# Patient Record
Sex: FEMALE | Race: AMERICAN INDIAN OR ALASKA NATIVE | NOT HISPANIC OR LATINO | Employment: PART TIME | URBAN - METROPOLITAN AREA
[De-identification: names, ages, dates, MRNs, and addresses within clinical notes are randomized per-mention and may not be internally consistent; named-entity substitution may affect disease eponyms.]

---
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
## 2021-07-19 ENCOUNTER — HISTORICAL (OUTPATIENT)
Dept: ADMINISTRATIVE | Facility: HOSPITAL | Age: 21
End: 2021-07-19

## 2021-07-19 LAB
EST. AVERAGE GLUCOSE BLD GHB EST-MCNC: 108.3 MG/DL
HBA1C MFR BLD: 5.4 %

## 2021-12-14 ENCOUNTER — HISTORICAL (OUTPATIENT)
Dept: CARDIOLOGY | Facility: HOSPITAL | Age: 21
End: 2021-12-14

## 2022-04-09 ENCOUNTER — HISTORICAL (OUTPATIENT)
Dept: ADMINISTRATIVE | Facility: HOSPITAL | Age: 22
End: 2022-04-09
Payer: MEDICAID

## 2022-04-26 VITALS
DIASTOLIC BLOOD PRESSURE: 83 MMHG | SYSTOLIC BLOOD PRESSURE: 110 MMHG | OXYGEN SATURATION: 98 % | BODY MASS INDEX: 29.12 KG/M2 | WEIGHT: 196.63 LBS | HEIGHT: 69 IN

## 2022-07-08 RX ORDER — RIZATRIPTAN BENZOATE 10 MG/1
1 TABLET ORAL
COMMUNITY
End: 2023-05-26 | Stop reason: SDUPTHER

## 2022-07-08 RX ORDER — VENLAFAXINE HYDROCHLORIDE 37.5 MG/1
1 CAPSULE, EXTENDED RELEASE ORAL DAILY
COMMUNITY
End: 2022-07-21 | Stop reason: SDUPTHER

## 2022-07-08 RX ORDER — GALCANEZUMAB 120 MG/ML
1 INJECTION, SOLUTION SUBCUTANEOUS
COMMUNITY
Start: 2021-11-11 | End: 2022-07-21

## 2022-07-21 ENCOUNTER — OFFICE VISIT (OUTPATIENT)
Dept: NEUROLOGY | Facility: CLINIC | Age: 22
End: 2022-07-21
Payer: MEDICAID

## 2022-07-21 VITALS
BODY MASS INDEX: 26.84 KG/M2 | WEIGHT: 181.19 LBS | TEMPERATURE: 98 F | OXYGEN SATURATION: 99 % | HEIGHT: 69 IN | HEART RATE: 76 BPM | DIASTOLIC BLOOD PRESSURE: 63 MMHG | SYSTOLIC BLOOD PRESSURE: 111 MMHG | RESPIRATION RATE: 18 BRPM

## 2022-07-21 DIAGNOSIS — F44.5 PSYCHOGENIC NONEPILEPTIC SEIZURE: ICD-10-CM

## 2022-07-21 DIAGNOSIS — G43.109 MIGRAINE WITH AURA AND WITHOUT STATUS MIGRAINOSUS, NOT INTRACTABLE: Primary | ICD-10-CM

## 2022-07-21 DIAGNOSIS — G47.9 SLEEP DISORDER: ICD-10-CM

## 2022-07-21 PROCEDURE — 1160F RVW MEDS BY RX/DR IN RCRD: CPT | Mod: CPTII,,, | Performed by: NURSE PRACTITIONER

## 2022-07-21 PROCEDURE — 3008F BODY MASS INDEX DOCD: CPT | Mod: CPTII,,, | Performed by: NURSE PRACTITIONER

## 2022-07-21 PROCEDURE — 1160F PR REVIEW ALL MEDS BY PRESCRIBER/CLIN PHARMACIST DOCUMENTED: ICD-10-PCS | Mod: CPTII,,, | Performed by: NURSE PRACTITIONER

## 2022-07-21 PROCEDURE — 99214 OFFICE O/P EST MOD 30 MIN: CPT | Mod: S$PBB,,, | Performed by: NURSE PRACTITIONER

## 2022-07-21 PROCEDURE — 3008F PR BODY MASS INDEX (BMI) DOCUMENTED: ICD-10-PCS | Mod: CPTII,,, | Performed by: NURSE PRACTITIONER

## 2022-07-21 PROCEDURE — 3074F PR MOST RECENT SYSTOLIC BLOOD PRESSURE < 130 MM HG: ICD-10-PCS | Mod: CPTII,,, | Performed by: NURSE PRACTITIONER

## 2022-07-21 PROCEDURE — 3078F PR MOST RECENT DIASTOLIC BLOOD PRESSURE < 80 MM HG: ICD-10-PCS | Mod: CPTII,,, | Performed by: NURSE PRACTITIONER

## 2022-07-21 PROCEDURE — 1159F PR MEDICATION LIST DOCUMENTED IN MEDICAL RECORD: ICD-10-PCS | Mod: CPTII,,, | Performed by: NURSE PRACTITIONER

## 2022-07-21 PROCEDURE — 99214 PR OFFICE/OUTPT VISIT, EST, LEVL IV, 30-39 MIN: ICD-10-PCS | Mod: S$PBB,,, | Performed by: NURSE PRACTITIONER

## 2022-07-21 PROCEDURE — 1159F MED LIST DOCD IN RCRD: CPT | Mod: CPTII,,, | Performed by: NURSE PRACTITIONER

## 2022-07-21 PROCEDURE — 3078F DIAST BP <80 MM HG: CPT | Mod: CPTII,,, | Performed by: NURSE PRACTITIONER

## 2022-07-21 PROCEDURE — 99214 OFFICE O/P EST MOD 30 MIN: CPT | Mod: PBBFAC | Performed by: NURSE PRACTITIONER

## 2022-07-21 PROCEDURE — 3074F SYST BP LT 130 MM HG: CPT | Mod: CPTII,,, | Performed by: NURSE PRACTITIONER

## 2022-07-21 RX ORDER — VENLAFAXINE HYDROCHLORIDE 75 MG/1
75 CAPSULE, EXTENDED RELEASE ORAL DAILY
Qty: 90 CAPSULE | Refills: 2 | Status: SHIPPED | OUTPATIENT
Start: 2022-07-21 | End: 2023-02-07 | Stop reason: SDUPTHER

## 2022-07-21 RX ORDER — DEXTROAMPHETAMINE SACCHARATE, AMPHETAMINE ASPARTATE, DEXTROAMPHETAMINE SULFATE AND AMPHETAMINE SULFATE 7.5; 7.5; 7.5; 7.5 MG/1; MG/1; MG/1; MG/1
30 TABLET ORAL 2 TIMES DAILY
COMMUNITY

## 2022-07-21 NOTE — PROGRESS NOTES
"Subjective:       Patient ID: Carolina Rabago is a 22 y.o. female.    Chief Complaint: Seizures    HPI   This is a 22 year old right handed female with PMHX of ADHD, migraine headache disorder , who was referred for paroxysmal events X1. Pt was last seen on 2022. During that visit, Effexor was increased to 75mg daily.    Today, pt states her migraines have improved. Has had 2 migraines since last visit. Maxalt effective. Today has a "regular" HA but believes it is due to not having eaten today.     Denies any further "seizure" activity. Is currently in phlebotomy school.   Sleep has improved    Paroxsysmal Event  Age of Onset - 21, one time event    Semiology - headache followed by LOC. possibly shaking. Mom has witnessed the event but did not elaborate on the details of the event.    Frequency - last event 10/2020    Provocation Factors - headache    Risk Factors -  - Family history of seizures? denied  - History of focal CNS lesions? denied  - History of CNS infection? denied  - History of Head Trauma with prolonged LOC? denied  - History of childhood seizures, including febrile seizures? denied  - History of developmental delay? denied  - History of underlying mood disorder? ADHD, taking Adderall during the day  - History of sleep disorder? not sleeping well at night  - Illicit drug use? denied  - Alcohol use? denied    Workup -  - routine EE2021 - Normal routine awake and drowsy EEG (read by Dr. Hernández)  - extended 1 hour EEG:  - ambulatory 72-96 hr EEG:  - EMU video EEG:  - MRI Brain:  - NCHCT:  - SPECT Brain:    Current AEDs -  none    Prior AEDs -  none    Headache Disorder  Age of Onset - teenage years    Semiology -  1. Holocephalic, pounding, severe, lasting 24 hrs, with n/v, w/ photo and phonophobia. - 3 MHD/Month  2. Bitemporal, dull, constant, mild, w/o nausea, w/ photophobia, not impeding day to day activity - 1 headache days per month    Pre-CGRP Inhibitor Frequency - 4 " MGD/month    Exacerbating Factors - stress.    Current ppx meds -  Effexor ER 75mg (1/19/2022 - present) effective    Current abortive meds -  Maxalt 10mg PO PRN migraine     Prior ppx meds -  TPM 50mg daily - anxiety  Amitriptyline 50mg qhs - weight gain, sedation. Only takes PRN.  Emgality 120mg/mL once per month    Prior abortive meds -   Sumatriptan PRN - ineffective     Review of Systems    Constitutional: no fever, fatigue, weakness  Eye: no vision loss, eye redness, drainage, or pain  ENMT: no sore throat, ear pain, sinus pain/congestion, nasal congestion/drainage  Respiratory: no cough, no wheezing, no shortness of breath  Cardiovascular: no chest pain, no palpitations, no edema  Gastrointestinal: no nausea, vomiting, or diarrhea. No abdominal pain  Genitourinary: no dysuria, no urinary frequency or urgency, no hematuria  Hema/Lymph: no abnormal bruising or bleeding  Endocrine: no heat or cold intolerance, no excessive thirst or excessive urination  Musculoskeletal: no muscle or joint pain, no joint swelling  Integumentary: no skin rash or abnormal lesion  Psychiatric: no depressed mood, no anxiety, no visual/auditory hallucinations, no delusions, no suicidal or homicidal ideation  Neurologic: as per HPI    Objective:      Physical Exam    General: well-developed well-nourished in no acute distress  Eye: clear conjunctiva, eyelids normal  HENT: TMs/ear canals clear, oropharynx without erythema/exudate, oropharynx and nasal mucosal surfaces moist, no maxillary/frontal sinus tenderness to palpation  Neck: full range of motion, no thyromegaly or lymphadenopathy  Respiratory: clear to auscultation bilaterally  Cardiovascular: regular rate and rhythm without murmurs, gallops or rubs  Gastrointestinal: soft, non-tender, non-distended with normal bowel sounds, without masses to palpation  Genitourinary: no CVA tenderness to palpation  Musculoskeletal: full range of motion of all extremities/spine without limitation  "or discomfort  Integumentary: no rashes or skin lesions present  Neurologic:  Mental Status- Alert and Oriented to time, self, place, Speech is fluent, with intact repetition, naming, reading, and comprehension, long term memory intact, No Dysarthria.  CN II-XII - CN I Deferred, LIZETT, no RAPD, VA grossly normal to finger counting at 6ft, No ptosis b/l, EOMI w/o nystagmus, LT/PP symmetric, intact in CN V1-V3 distribution b/l, masseter symmetric b/l, T/U midline, Shoulder shrug symmetric b/l, Hearing grossly intact b/l, VFFC, Face Symmetric.  Motor - Tone and Bulk nml throughout, No involuntary movements, 5/5 to confrontation throughout, FFM nml b/l, No pronator drift.  Sensory - LT/PP/Temp/Proprioception/Vibration symmetric intact throughout.  Reflexes - 2+ Throughout  Cerebellar Exam - FNF wnl b/l no intention tremor.  Romberg - negative.  Gait - Normal, bilat arm swing intaact    Assessment:       1. Migraine with aura and without status migrainosus, not intractable    2. Psychogenic nonepileptic seizure    3. Sleep disorder      Plan:       This is a 22 year old right handed female with PMHX of ADHD, Migraine headache disorder, Anxiety disorder who was referred for paroxysmal event with description consistent with PNES. Also has underlying episodic migraine headache w/o status, and episodic tension headaches. Denies any further "seizure" activity. Notes improvement with migraine frequency on Effexor.     # PNES  [] recommend treating underlying anxiety - patient provided number to [facility]    # Episodic Migraine Headache w/o Aura  [] patient does not meet criteria for monthly CGRP inhibitor biologics.  [] c/w Maxalt 10mg BID PRN  [] c/w Effexor XR to 75mg daily  [] Handout on sleep hygiene provided    RTC 6 months - TM okay    Headache education provided - including good sleep hygiene, stress management, medication overuse education provided - using more 3 OTC per week may worsen headaches, High " intensity interval training has shown to reduce headache frequency. Low carb, high protein has shown to reduce headache frequency. Patient is instructed to keep headache diary.    I have explained the treatment plan, diagnosis, and prognosis to the patient, caretaker, family. All questions are answered to the best of my knowledge.    Face to Face Time 30 minute, including, counseling, education, review of test results, relevant medical records, and coordination of care.

## 2023-02-07 ENCOUNTER — OFFICE VISIT (OUTPATIENT)
Dept: NEUROLOGY | Facility: CLINIC | Age: 23
End: 2023-02-07
Payer: MEDICAID

## 2023-02-07 VITALS
BODY MASS INDEX: 29.91 KG/M2 | RESPIRATION RATE: 18 BRPM | OXYGEN SATURATION: 98 % | HEIGHT: 69 IN | WEIGHT: 201.94 LBS | HEART RATE: 84 BPM | DIASTOLIC BLOOD PRESSURE: 79 MMHG | SYSTOLIC BLOOD PRESSURE: 121 MMHG | TEMPERATURE: 98 F

## 2023-02-07 DIAGNOSIS — G47.9 SLEEP DISORDER: ICD-10-CM

## 2023-02-07 DIAGNOSIS — F44.5 PSYCHOGENIC NONEPILEPTIC SEIZURE: ICD-10-CM

## 2023-02-07 DIAGNOSIS — G43.109 MIGRAINE WITH AURA AND WITHOUT STATUS MIGRAINOSUS, NOT INTRACTABLE: Primary | ICD-10-CM

## 2023-02-07 DIAGNOSIS — M54.2 NECK PAIN: ICD-10-CM

## 2023-02-07 PROCEDURE — 99214 PR OFFICE/OUTPT VISIT, EST, LEVL IV, 30-39 MIN: ICD-10-PCS | Mod: S$PBB,,, | Performed by: NURSE PRACTITIONER

## 2023-02-07 PROCEDURE — 99214 OFFICE O/P EST MOD 30 MIN: CPT | Mod: S$PBB,,, | Performed by: NURSE PRACTITIONER

## 2023-02-07 PROCEDURE — 1159F MED LIST DOCD IN RCRD: CPT | Mod: CPTII,,, | Performed by: NURSE PRACTITIONER

## 2023-02-07 PROCEDURE — 3078F DIAST BP <80 MM HG: CPT | Mod: CPTII,,, | Performed by: NURSE PRACTITIONER

## 2023-02-07 PROCEDURE — 3008F PR BODY MASS INDEX (BMI) DOCUMENTED: ICD-10-PCS | Mod: CPTII,,, | Performed by: NURSE PRACTITIONER

## 2023-02-07 PROCEDURE — 99214 OFFICE O/P EST MOD 30 MIN: CPT | Mod: PBBFAC | Performed by: NURSE PRACTITIONER

## 2023-02-07 PROCEDURE — 3078F PR MOST RECENT DIASTOLIC BLOOD PRESSURE < 80 MM HG: ICD-10-PCS | Mod: CPTII,,, | Performed by: NURSE PRACTITIONER

## 2023-02-07 PROCEDURE — 1160F RVW MEDS BY RX/DR IN RCRD: CPT | Mod: CPTII,,, | Performed by: NURSE PRACTITIONER

## 2023-02-07 PROCEDURE — 1160F PR REVIEW ALL MEDS BY PRESCRIBER/CLIN PHARMACIST DOCUMENTED: ICD-10-PCS | Mod: CPTII,,, | Performed by: NURSE PRACTITIONER

## 2023-02-07 PROCEDURE — 3074F PR MOST RECENT SYSTOLIC BLOOD PRESSURE < 130 MM HG: ICD-10-PCS | Mod: CPTII,,, | Performed by: NURSE PRACTITIONER

## 2023-02-07 PROCEDURE — 3008F BODY MASS INDEX DOCD: CPT | Mod: CPTII,,, | Performed by: NURSE PRACTITIONER

## 2023-02-07 PROCEDURE — 3074F SYST BP LT 130 MM HG: CPT | Mod: CPTII,,, | Performed by: NURSE PRACTITIONER

## 2023-02-07 PROCEDURE — 1159F PR MEDICATION LIST DOCUMENTED IN MEDICAL RECORD: ICD-10-PCS | Mod: CPTII,,, | Performed by: NURSE PRACTITIONER

## 2023-02-07 RX ORDER — VENLAFAXINE HYDROCHLORIDE 37.5 MG/1
37.5 CAPSULE, EXTENDED RELEASE ORAL DAILY
Qty: 90 CAPSULE | Refills: 2 | Status: SHIPPED | OUTPATIENT
Start: 2023-02-07 | End: 2023-02-07

## 2023-02-07 RX ORDER — VENLAFAXINE HYDROCHLORIDE 37.5 MG/1
37.5 CAPSULE, EXTENDED RELEASE ORAL DAILY
Qty: 90 CAPSULE | Refills: 2 | Status: SHIPPED | OUTPATIENT
Start: 2023-02-07 | End: 2023-05-26 | Stop reason: SDUPTHER

## 2023-02-07 RX ORDER — TIZANIDINE 2 MG/1
4 TABLET ORAL 2 TIMES DAILY PRN
Qty: 60 TABLET | Refills: 2 | Status: SHIPPED | OUTPATIENT
Start: 2023-02-07 | End: 2024-02-17

## 2023-02-07 NOTE — PROGRESS NOTES
"Subjective:       Patient ID: Carolina Rabago is a 22 y.o. female.    Chief Complaint: Migraine (Pt stated migraines are getting bad again)    HPI   This is a 22 year old right handed female with PMHX of ADHD, migraine headache disorder, who was referred for paroxysmal events X1. Pt was last seen on 2022. During that visit, Effexor 75mg Qday and Maxalt 10mg PO BID PRN were continued.    Today, Pt c/o regular HA, this is the 3rd, since last Thursday. Located to lower neck area. May take ibuprofen 800mg or Tylenol ES 2 tabs PRN with minimal relief. Requesting a decrease in Effexor as she feeling more "anxious". Migraines are same, last one in November. Maxalt continues to be effective.    Denies any further "seizure" activity. Is currently in phlebotomy school.   Sleep has improved    Paroxsysmal Event  Age of Onset - 21, one time event    Semiology - headache followed by LOC. possibly shaking. Mom has witnessed the event but did not elaborate on the details of the event.    Frequency - last event 10/2020    Provocation Factors - headache    Risk Factors -  - Family history of seizures? denied  - History of focal CNS lesions? denied  - History of CNS infection? denied  - History of Head Trauma with prolonged LOC? denied  - History of childhood seizures, including febrile seizures? denied  - History of developmental delay? denied  - History of underlying mood disorder? ADHD, taking Adderall during the day  - History of sleep disorder? not sleeping well at night  - Illicit drug use? denied  - Alcohol use? denied    Workup -  - routine EE2021 - Normal routine awake and drowsy EEG (read by Dr. Hernández)  - extended 1 hour EEG:  - ambulatory 72-96 hr EEG:  - EMU video EEG:  - MRI Brain:  - NCHCT:  - SPECT Brain:    Current AEDs -  none    Prior AEDs -  none    Headache Disorder  Age of Onset - teenage years    Semiology -  1. Holocephalic, pounding, severe, lasting 24 hrs, with n/v, w/ photo and phonophobia. - 3 " MHD/Month  2. Bitemporal, dull, constant, mild, w/o nausea, w/ photophobia, not impeding day to day activity - 1 headache days per month    Pre-CGRP Inhibitor Frequency - 4 MGD/month    Exacerbating Factors - stress.    Current ppx meds -  Effexor ER 75mg (1/19/2022 - present) effective    Current abortive meds -  Maxalt 10mg PO PRN migraine     Prior ppx meds -  TPM 50mg daily - anxiety  Amitriptyline 50mg qhs - weight gain, sedation. Only takes PRN.  Emgality 120mg/mL once per month    Prior abortive meds -   Sumatriptan PRN - ineffective     Review of Systems    Constitutional: no fever, fatigue, weakness  Eye: no vision loss, eye redness, drainage, or pain  ENMT: no sore throat, ear pain, sinus pain/congestion, nasal congestion/drainage  Respiratory: no cough, no wheezing, no shortness of breath  Cardiovascular: no chest pain, no palpitations, no edema  Gastrointestinal: no nausea, vomiting, or diarrhea. No abdominal pain  Genitourinary: no dysuria, no urinary frequency or urgency, no hematuria  Hema/Lymph: no abnormal bruising or bleeding  Endocrine: no heat or cold intolerance, no excessive thirst or excessive urination  Musculoskeletal: no muscle or joint pain, no joint swelling  Integumentary: no skin rash or abnormal lesion  Psychiatric: no depressed mood, no anxiety, no visual/auditory hallucinations, no delusions, no suicidal or homicidal ideation  Neurologic: as per HPI    Objective:      Physical Exam    General: well-developed well-nourished in no acute distress  Eye: clear conjunctiva, eyelids normal  HENT: TMs/ear canals clear, oropharynx without erythema/exudate, oropharynx and nasal mucosal surfaces moist, no maxillary/frontal sinus tenderness to palpation  Neck: full range of motion, no thyromegaly or lymphadenopathy  Respiratory: clear to auscultation bilaterally  Cardiovascular: regular rate and rhythm without murmurs, gallops or rubs  Gastrointestinal: soft, non-tender, non-distended with  "normal bowel sounds, without masses to palpation  Genitourinary: no CVA tenderness to palpation  Musculoskeletal: full range of motion of all extremities/spine without limitation or discomfort  Integumentary: no rashes or skin lesions present  Neurologic:  Mental Status- Alert and Oriented to time, self, place, Speech is fluent, with intact repetition, naming, reading, and comprehension, long term memory intact, No Dysarthria.  CN II-XII - CN I Deferred, LIZETT, no RAPD, VA grossly normal to finger counting at 6ft, No ptosis b/l, EOMI w/o nystagmus, LT/PP symmetric, intact in CN V1-V3 distribution b/l, masseter symmetric b/l, T/U midline, Shoulder shrug symmetric b/l, Hearing grossly intact b/l, VFFC, Face Symmetric.  Motor - Tone and Bulk nml throughout, No involuntary movements, 5/5 to confrontation throughout, FFM nml b/l, No pronator drift.  Sensory - LT/PP/Temp/Proprioception/Vibration symmetric intact throughout.  Reflexes - 2+ Throughout  Cerebellar Exam - FNF wnl b/l no intention tremor.  Romberg - negative.  Gait - Normal, bilat arm swing intaact    Assessment:       1. Migraine with aura and without status migrainosus, not intractable    2. Psychogenic nonepileptic seizure    3. Sleep disorder        Plan:       This is a 22 year old right handed female with PMHX of ADHD, Migraine headache disorder, Anxiety disorder who was referred for paroxysmal event with description consistent with PNES. Also has underlying episodic migraine headache w/o status, and episodic tension headaches. Denies any further "seizure" activity. Notes improvement with migraine frequency on Effexor.     # PNES  [] recommend treating underlying anxiety - patient provided number to [facility]    # Episodic Migraine Headache w/o Aura  [] patient does not meet criteria for monthly CGRP inhibitor biologics.  [] c/w Maxalt 10mg BID PRN  [] decrease Effexor XR to 37.5mg daily  [] start tizanidine 2mg PO BID PRN neck pain/HA  [] " Handout on sleep hygiene provided    RTC 3 months    Headache education provided - including good sleep hygiene, stress management, medication overuse education provided - using more 3 OTC per week may worsen headaches, High intensity interval training has shown to reduce headache frequency. Low carb, high protein has shown to reduce headache frequency. Patient is instructed to keep headache diary.    I have explained the treatment plan, diagnosis, and prognosis to the patient, caretaker, family. All questions are answered to the best of my knowledge.    Face to Face Time 30 minute, including, counseling, education, review of test results, relevant medical records, and coordination of care.

## 2023-05-26 ENCOUNTER — OFFICE VISIT (OUTPATIENT)
Dept: NEUROLOGY | Facility: CLINIC | Age: 23
End: 2023-05-26
Payer: MEDICAID

## 2023-05-26 VITALS
BODY MASS INDEX: 29.92 KG/M2 | SYSTOLIC BLOOD PRESSURE: 115 MMHG | HEART RATE: 95 BPM | WEIGHT: 202 LBS | HEIGHT: 69 IN | OXYGEN SATURATION: 100 % | DIASTOLIC BLOOD PRESSURE: 79 MMHG

## 2023-05-26 DIAGNOSIS — G43.109 MIGRAINE WITH AURA AND WITHOUT STATUS MIGRAINOSUS, NOT INTRACTABLE: Primary | ICD-10-CM

## 2023-05-26 DIAGNOSIS — H53.8 BLURRED VISION, LEFT EYE: ICD-10-CM

## 2023-05-26 DIAGNOSIS — F44.5 PSYCHOGENIC NONEPILEPTIC SEIZURE: ICD-10-CM

## 2023-05-26 DIAGNOSIS — F41.9 ANXIETY: ICD-10-CM

## 2023-05-26 DIAGNOSIS — G47.9 SLEEP DISORDER: ICD-10-CM

## 2023-05-26 PROCEDURE — 1160F PR REVIEW ALL MEDS BY PRESCRIBER/CLIN PHARMACIST DOCUMENTED: ICD-10-PCS | Mod: CPTII,,, | Performed by: NURSE PRACTITIONER

## 2023-05-26 PROCEDURE — 99214 OFFICE O/P EST MOD 30 MIN: CPT | Mod: S$PBB,,, | Performed by: NURSE PRACTITIONER

## 2023-05-26 PROCEDURE — 1159F MED LIST DOCD IN RCRD: CPT | Mod: CPTII,,, | Performed by: NURSE PRACTITIONER

## 2023-05-26 PROCEDURE — 1160F RVW MEDS BY RX/DR IN RCRD: CPT | Mod: CPTII,,, | Performed by: NURSE PRACTITIONER

## 2023-05-26 PROCEDURE — 3008F BODY MASS INDEX DOCD: CPT | Mod: CPTII,,, | Performed by: NURSE PRACTITIONER

## 2023-05-26 PROCEDURE — 99214 PR OFFICE/OUTPT VISIT, EST, LEVL IV, 30-39 MIN: ICD-10-PCS | Mod: S$PBB,,, | Performed by: NURSE PRACTITIONER

## 2023-05-26 PROCEDURE — 1159F PR MEDICATION LIST DOCUMENTED IN MEDICAL RECORD: ICD-10-PCS | Mod: CPTII,,, | Performed by: NURSE PRACTITIONER

## 2023-05-26 PROCEDURE — 3078F PR MOST RECENT DIASTOLIC BLOOD PRESSURE < 80 MM HG: ICD-10-PCS | Mod: CPTII,,, | Performed by: NURSE PRACTITIONER

## 2023-05-26 PROCEDURE — 99214 OFFICE O/P EST MOD 30 MIN: CPT | Mod: PBBFAC | Performed by: NURSE PRACTITIONER

## 2023-05-26 PROCEDURE — 3008F PR BODY MASS INDEX (BMI) DOCUMENTED: ICD-10-PCS | Mod: CPTII,,, | Performed by: NURSE PRACTITIONER

## 2023-05-26 PROCEDURE — 3078F DIAST BP <80 MM HG: CPT | Mod: CPTII,,, | Performed by: NURSE PRACTITIONER

## 2023-05-26 PROCEDURE — 3074F SYST BP LT 130 MM HG: CPT | Mod: CPTII,,, | Performed by: NURSE PRACTITIONER

## 2023-05-26 PROCEDURE — 3074F PR MOST RECENT SYSTOLIC BLOOD PRESSURE < 130 MM HG: ICD-10-PCS | Mod: CPTII,,, | Performed by: NURSE PRACTITIONER

## 2023-05-26 RX ORDER — VENLAFAXINE HYDROCHLORIDE 75 MG/1
75 CAPSULE, EXTENDED RELEASE ORAL DAILY
Qty: 90 CAPSULE | Refills: 2 | Status: SHIPPED | OUTPATIENT
Start: 2023-05-26 | End: 2023-08-30 | Stop reason: SDUPTHER

## 2023-05-26 RX ORDER — ONDANSETRON 4 MG/1
4 TABLET, ORALLY DISINTEGRATING ORAL
COMMUNITY
Start: 2023-02-09

## 2023-05-26 RX ORDER — RIZATRIPTAN BENZOATE 10 MG/1
10 TABLET ORAL
Qty: 12 TABLET | Refills: 2 | Status: SHIPPED | OUTPATIENT
Start: 2023-05-26 | End: 2023-11-30 | Stop reason: SDUPTHER

## 2023-05-26 RX ORDER — ASPIRIN 325 MG
50000 TABLET, DELAYED RELEASE (ENTERIC COATED) ORAL
COMMUNITY
Start: 2023-04-06

## 2023-05-26 RX ORDER — HYDROXYZINE PAMOATE 25 MG/1
25 CAPSULE ORAL 4 TIMES DAILY PRN
COMMUNITY
Start: 2022-12-21

## 2023-05-26 RX ORDER — NORETHINDRONE ACETATE AND ETHINYL ESTRADIOL 1MG-20(21)
1 KIT ORAL DAILY
COMMUNITY
Start: 2023-04-06 | End: 2024-03-04

## 2023-05-26 NOTE — PROGRESS NOTES
"[facility] Neurology Follow Up Visit Note  Subjective:       Patient ID: Carolina Rabago is a 22 y.o. female.    Chief Complaint: Migraine (Pt reports no migraines for a few months, but does report mild headaches occasionally)    HPI   This is a 22 year old right handed female with PMHX of ADHD, migraine headache disorder, who was referred for paroxysmal events X1. Pt was last seen on 2/7/2023. During that visit, Effexor was decreased to 37.5mg Qday, Maxalt 10mg PO BID PRN was continued and tizanidine 2mg PO BID PRN was initiated.    Today, Pt c/o regular HA that occur twice weekly. Last one last night, but believes related to lack of sleep. Also admits to decreased dietary intake. Does not a full meal until 4 in afternoon. HA starts in lower neck area and radiates to head and eyes on occasion. May take ibuprofen 800mg or Tylenol ES 2 tabs PRN with minimal relief. Tizanidine effective for neck pain and HA. Maxalt effective as abortive therapy for migraine. Cannot recall last migraine    Denies any further "seizure" activity. 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 States anxiety has worsened and feels like she is having a "panic" attack. Not exacerbated by thing that she can think of.  Last one a month ago.    Sleep is intermittent. Works late shift and gets off at 1AM.    Paroxsysmal Event  Age of Onset - 21, one time event    Semiology - headache followed by LOC. possibly shaking. Mom has witnessed the event but did not elaborate on the details of the event.    Frequency - last event 10/2020    Provocation Factors - headache    Risk Factors -  - Family history of seizures? denied  - History of focal CNS lesions? denied  - History of CNS infection? denied  - History of Head Trauma with prolonged LOC? denied  - History of childhood seizures, including febrile seizures? denied  - History of developmental delay? denied  - History of underlying mood disorder? ADHD, taking Adderall during the day  - " History of sleep disorder? not sleeping well at night  - Illicit drug use? denied  - Alcohol use? denied    Workup -  - routine EE2021 - Normal routine awake and drowsy EEG (read by Dr. Hernández)  - extended 1 hour EEG:  - ambulatory 72-96 hr EEG:  - EMU video EEG:  - MRI Brain:  - NCHCT:  - SPECT Brain:    Current AEDs -  none    Prior AEDs -  none    Headache Disorder  Age of Onset - teenage years    Semiology -  1. Holocephalic, pounding, severe, lasting 24 hrs, with n/v, w/ photo and phonophobia. - 3 MHD/Month  2. Bitemporal, dull, constant, mild, w/o nausea, w/ photophobia, not impeding day to day activity - 1 headache days per month    Pre-CGRP Inhibitor Frequency - 4 MGD/month    Exacerbating Factors - stress.    Current ppx meds -  Effexor ER 37.5mg (2022 - present) effective    Current abortive meds -  Maxalt 10mg PO PRN migraine     Prior ppx meds -  TPM 50mg daily - anxiety  Amitriptyline 50mg qhs - weight gain, sedation. Only takes PRN.  Emgality 120mg/mL once per month    Prior abortive meds -   Sumatriptan PRN - ineffective     Review of Systems    Constitutional: no fever, fatigue, weakness  Eye: no vision loss, eye redness, drainage, or pain  ENMT: no sore throat, ear pain, sinus pain/congestion, nasal congestion/drainage  Respiratory: no cough, no wheezing, no shortness of breath  Cardiovascular: no chest pain, no palpitations, no edema  Gastrointestinal: no nausea, vomiting, or diarrhea. No abdominal pain  Genitourinary: no dysuria, no urinary frequency or urgency, no hematuria  Hema/Lymph: no abnormal bruising or bleeding  Endocrine: no heat or cold intolerance, no excessive thirst or excessive urination  Musculoskeletal: no muscle or joint pain, no joint swelling  Integumentary: no skin rash or abnormal lesion  Psychiatric: no depressed mood, no anxiety, no visual/auditory hallucinations, no delusions, no suicidal or homicidal ideation  Neurologic: as per HPI    Objective:      Physical  Exam    General: well-developed well-nourished in no acute distress  Eye: clear conjunctiva, eyelids normal  HENT: oropharynx without erythema/exudate, oropharynx and nasal mucosal surfaces moist, no maxillary/frontal sinus tenderness to palpation  Neck: full range of motion, no thyromegaly or lymphadenopathy  Respiratory: clear to auscultation bilaterally  Cardiovascular: regular rate and rhythm   Gastrointestinal: non-distended  Musculoskeletal: full range of motion of all extremities/spine without limitation or discomfort  Integumentary: no rashes or skin lesions present    Neurologic:  Mental Status- Alert and Oriented to time, self, place, Speech is fluent, with intact repetition, naming, reading, and comprehension, long term memory intact, No Dysarthria.  CN II-XII - CN I Deferred, LIZETT, no RAPD, VA grossly normal to finger counting at 6ft, No ptosis b/l, EOMI w/o nystagmus, LT/PP symmetric, intact in CN V1-V3 distribution b/l, masseter symmetric b/l, T/U midline, Shoulder shrug symmetric b/l, Hearing grossly intact b/l, VFFC, Face Symmetric.  Motor - Tone and Bulk nml throughout, No involuntary movements, 5/5 to confrontation throughout, FFM nml b/l, No pronator drift.  Sensory - LT/PP/Temp/Proprioception/Vibration symmetric intact throughout.  Reflexes - 2+ Throughout  Cerebellar Exam - FNF wnl b/l no intention tremor.  Romberg - negative.  Gait - Normal, toe gait, heel gait and tandem gait intact, bilat arm swing intaact    Assessment:       1. Migraine with aura and without status migrainosus, not intractable  - venlafaxine (EFFEXOR-XR) 75 MG 24 hr capsule; Take 1 capsule (75 mg total) by mouth once daily.  Dispense: 90 capsule; Refill: 2    2. Psychogenic nonepileptic seizure    3. Sleep disorder    4. Blurred vision, left eye  - Ambulatory referral/consult to Ophthalmology; Future    5. Anxiety  - venlafaxine (EFFEXOR-XR) 75 MG 24 hr capsule; Take 1 capsule (75 mg total) by mouth once daily.  Dispense:  "90 capsule; Refill: 2      Plan:       This is a 22 year old right handed female with PMHX of ADHD, Migraine headache disorder, Anxiety disorder who was referred for paroxysmal event with description consistent with PNES. Also has underlying episodic migraine headache w/o status, and episodic tension headaches. Denies any further "seizure" activity. Notes improvement with migraine frequency on Effexor. Attributes regular Ha to lack of sleep and anxiety.    # PNES  [] call PCP and request referral to [facility] mental health services for anxiety    # Episodic Migraine Headache w/o Aura  [] patient does not meet criteria for monthly CGRP inhibitor biologics.  [] c/w Maxalt 10mg BID PRN  [] increase Effexor XR to 75mg daily  [] c/w tizanidine 2mg PO BID PRN neck pain/HA  [] Handout on meditation provided    RTC 3 months    Headache education provided - including good sleep hygiene, stress management, medication overuse education provided - using more 3 OTC per week may worsen headaches, High intensity interval training has shown to reduce headache frequency. Low carb, high protein has shown to reduce headache frequency. Patient is instructed to keep headache diary.    I have explained the treatment plan, diagnosis, and prognosis to the patient, caretaker, family. All questions are answered to the best of my knowledge.    Face to Face Time 30 minute, including, counseling, education, review of test results, relevant medical records, and coordination of care.                "

## 2023-08-30 ENCOUNTER — OFFICE VISIT (OUTPATIENT)
Dept: NEUROLOGY | Facility: CLINIC | Age: 23
End: 2023-08-30
Payer: MEDICAID

## 2023-08-30 VITALS
WEIGHT: 202 LBS | HEART RATE: 95 BPM | HEIGHT: 69 IN | OXYGEN SATURATION: 98 % | SYSTOLIC BLOOD PRESSURE: 116 MMHG | BODY MASS INDEX: 29.92 KG/M2 | DIASTOLIC BLOOD PRESSURE: 86 MMHG

## 2023-08-30 DIAGNOSIS — G43.109 MIGRAINE WITH AURA AND WITHOUT STATUS MIGRAINOSUS, NOT INTRACTABLE: Primary | ICD-10-CM

## 2023-08-30 DIAGNOSIS — G47.00 INSOMNIA, UNSPECIFIED TYPE: ICD-10-CM

## 2023-08-30 DIAGNOSIS — F44.5 PSYCHOGENIC NONEPILEPTIC SEIZURE: ICD-10-CM

## 2023-08-30 DIAGNOSIS — F41.9 ANXIETY: ICD-10-CM

## 2023-08-30 PROCEDURE — 99214 OFFICE O/P EST MOD 30 MIN: CPT | Mod: PBBFAC | Performed by: NURSE PRACTITIONER

## 2023-08-30 PROCEDURE — 3079F DIAST BP 80-89 MM HG: CPT | Mod: CPTII,,, | Performed by: NURSE PRACTITIONER

## 2023-08-30 PROCEDURE — 1160F PR REVIEW ALL MEDS BY PRESCRIBER/CLIN PHARMACIST DOCUMENTED: ICD-10-PCS | Mod: CPTII,,, | Performed by: NURSE PRACTITIONER

## 2023-08-30 PROCEDURE — 99214 OFFICE O/P EST MOD 30 MIN: CPT | Mod: S$PBB,,, | Performed by: NURSE PRACTITIONER

## 2023-08-30 PROCEDURE — 3074F PR MOST RECENT SYSTOLIC BLOOD PRESSURE < 130 MM HG: ICD-10-PCS | Mod: CPTII,,, | Performed by: NURSE PRACTITIONER

## 2023-08-30 PROCEDURE — 3008F PR BODY MASS INDEX (BMI) DOCUMENTED: ICD-10-PCS | Mod: CPTII,,, | Performed by: NURSE PRACTITIONER

## 2023-08-30 PROCEDURE — 3008F BODY MASS INDEX DOCD: CPT | Mod: CPTII,,, | Performed by: NURSE PRACTITIONER

## 2023-08-30 PROCEDURE — 3074F SYST BP LT 130 MM HG: CPT | Mod: CPTII,,, | Performed by: NURSE PRACTITIONER

## 2023-08-30 PROCEDURE — 99214 PR OFFICE/OUTPT VISIT, EST, LEVL IV, 30-39 MIN: ICD-10-PCS | Mod: S$PBB,,, | Performed by: NURSE PRACTITIONER

## 2023-08-30 PROCEDURE — 3079F PR MOST RECENT DIASTOLIC BLOOD PRESSURE 80-89 MM HG: ICD-10-PCS | Mod: CPTII,,, | Performed by: NURSE PRACTITIONER

## 2023-08-30 PROCEDURE — 1160F RVW MEDS BY RX/DR IN RCRD: CPT | Mod: CPTII,,, | Performed by: NURSE PRACTITIONER

## 2023-08-30 PROCEDURE — 1159F MED LIST DOCD IN RCRD: CPT | Mod: CPTII,,, | Performed by: NURSE PRACTITIONER

## 2023-08-30 PROCEDURE — 1159F PR MEDICATION LIST DOCUMENTED IN MEDICAL RECORD: ICD-10-PCS | Mod: CPTII,,, | Performed by: NURSE PRACTITIONER

## 2023-08-30 RX ORDER — TRAZODONE HYDROCHLORIDE 50 MG/1
25 TABLET ORAL NIGHTLY
Qty: 15 TABLET | Refills: 2 | Status: SHIPPED | OUTPATIENT
Start: 2023-08-30 | End: 2024-08-29

## 2023-08-30 RX ORDER — VENLAFAXINE HYDROCHLORIDE 75 MG/1
75 CAPSULE, EXTENDED RELEASE ORAL DAILY
Qty: 90 CAPSULE | Refills: 2 | Status: SHIPPED | OUTPATIENT
Start: 2023-08-30 | End: 2023-11-30 | Stop reason: SDUPTHER

## 2023-08-30 NOTE — PROGRESS NOTES
"[facility] Neurology Follow Up Office Visit Note  Subjective:      Patient ID: Carolina Rabago is a 23 y.o. female.    Chief Complaint: Migraine (Pt reports migraines have been bad lately. States she has one today.)    HPI   This is a 23 year old right handed female with PMHX of ADHD, migraine headache disorder, who was referred for paroxysmal events X1. Pt was last seen on 5/26/2023. During that visit, Effexor was increased to 75mg Qday, Maxalt 10mg PO BID PRN and tizanidine 2mg PO BID PRN were continued.    Today, Pt states she awoke w/HA located to bilat temples. Took Tylenol with no relief thus far. These type of HA occur almost daily. Not sleeping well at this time. Is currently enrolled in a biology class. Work schedule is not consistent. May work days or nights. Averages 4-5 hours of sleep nightly. Does not feel rested.    Also admits to decreased dietary intake, states she does not feel hungry. May have a bagel for breakfast then eats a "full" lunch. Also has HA that starts in lower neck area and radiates to head and eyes on occasion. May take ibuprofen 800mg or Tylenol ES 2 tabs PRN with minimal relief. Tizanidine effective for neck pain and HA. Maxalt effective as abortive therapy for migraine. Cannot recall last migraine. Melatonin causes HA. Has also tried clonidine, did not like the way it made her feel.    Denies any further "seizure" activity. 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 States anxiety is stable. Some days better than others. Has not had a panic attack in a few months. Not exacerbated by thing that she can think of.    Paroxsysmal Event  Age of Onset - 21, one time event    Semiology - headache followed by LOC. possibly shaking. Mom has witnessed the event but did not elaborate on the details of the event.    Frequency - last event 10/2020    Provocation Factors - headache    Risk Factors -  - Family history of seizures? denied  - History of focal CNS lesions? denied  - " History of CNS infection? denied  - History of Head Trauma with prolonged LOC? denied  - History of childhood seizures, including febrile seizures? denied  - History of developmental delay? denied  - History of underlying mood disorder? ADHD, taking Adderall during the day  - History of sleep disorder? not sleeping well at night  - Illicit drug use? denied  - Alcohol use? denied    Workup -  - routine EE2021 - Normal routine awake and drowsy EEG (read by Dr. Hernández)  - extended 1 hour EEG:  - ambulatory 72-96 hr EEG:  - EMU video EEG:  - MRI Brain:  - NCHCT:  - SPECT Brain:    Current AEDs -  none    Prior AEDs -  none    Headache Disorder  Age of Onset - teenage years    Semiology -  1. Holocephalic, pounding, severe, lasting 24 hrs, with n/v, w/ photo and phonophobia. - 3 MHD/Month  2. Bitemporal, dull, constant, mild, w/o nausea, w/ photophobia, not impeding day to day activity - 1 headache days per month    Pre-CGRP Inhibitor Frequency - 4 MGD/month    Exacerbating Factors - stress.    Current ppx meds -  Effexor ER 37.5mg (2022 - present) effective    Current abortive meds -  Maxalt 10mg PO PRN migraine     Prior ppx meds -  TPM 50mg daily - anxiety  Amitriptyline 50mg qhs - weight gain, sedation. Only takes PRN.  Emgality 120mg/mL once per month    Prior abortive meds -   Sumatriptan PRN - ineffective     Review of Systems    Constitutional: no fever, fatigue, weakness  Eye: no vision loss, eye redness, drainage, or pain  ENMT: no sore throat, ear pain, sinus pain/congestion, nasal congestion/drainage  Respiratory: no cough, no wheezing, no shortness of breath  Cardiovascular: no chest pain, no palpitations, no edema  Gastrointestinal: no nausea, vomiting, or diarrhea. No abdominal pain  Genitourinary: no dysuria, no urinary frequency or urgency, no hematuria  Hema/Lymph: no abnormal bruising or bleeding  Endocrine: no heat or cold intolerance, no excessive thirst or excessive  urination  Musculoskeletal: no muscle or joint pain, no joint swelling  Integumentary: no skin rash or abnormal lesion  Psychiatric: no depressed mood, no anxiety, no visual/auditory hallucinations, no delusions, no suicidal or homicidal ideation  Neurologic: as per HPI    Objective:      Physical Exam    General: well-developed well-nourished in no acute distress  Eye: clear conjunctiva, eyelids normal  HENT: oropharynx without erythema/exudate, oropharynx and nasal mucosal surfaces moist, no maxillary/frontal sinus tenderness to palpation  Neck: full range of motion, no thyromegaly or lymphadenopathy  Respiratory: clear to auscultation bilaterally  Cardiovascular: regular rate and rhythm   Gastrointestinal: non-distended  Musculoskeletal: full range of motion of all extremities/spine without limitation or discomfort  Integumentary: no rashes or skin lesions present    Neurologic:  Mental Status- Alert and Oriented to time, self, place, Speech is fluent, with intact repetition, naming, reading, and comprehension, long term memory intact, No Dysarthria.  CN II-XII - CN I Deferred, LIZETT, no RAPD, VA grossly normal to finger counting at 6ft, No ptosis b/l, EOMI w/o nystagmus, LT/PP symmetric, intact in CN V1-V3 distribution b/l, masseter symmetric b/l, T/U midline, Shoulder shrug symmetric b/l, Hearing grossly intact b/l, VFFC, Face Symmetric.  Motor - Tone and Bulk nml throughout, No involuntary movements, 5/5 to confrontation throughout, FFM nml b/l, No pronator drift.  Sensory - LT/PP/Temp/Proprioception/Vibration symmetric intact throughout.  Reflexes - 2+ Throughout  Cerebellar Exam - FNF wnl b/l no intention tremor.  Romberg - negative.  Gait - Normal, toe gait, heel gait and tandem gait intact, bilat arm swing intaact    Assessment:       1. Migraine with aura and without status migrainosus, not intractable  - venlafaxine (EFFEXOR-XR) 75 MG 24 hr capsule; Take 1 capsule (75 mg total) by mouth once daily.   "Dispense: 90 capsule; Refill: 2    2. Psychogenic nonepileptic seizure    3. Insomnia, unspecified type  - traZODone (DESYREL) 50 MG tablet; Take 0.5 tablets (25 mg total) by mouth every evening.  Dispense: 15 tablet; Refill: 2    4. Anxiety  - venlafaxine (EFFEXOR-XR) 75 MG 24 hr capsule; Take 1 capsule (75 mg total) by mouth once daily.  Dispense: 90 capsule; Refill: 2      Plan:       This is a 23 year old right handed female with PMHX of ADHD, Migraine headache disorder, Anxiety disorder who was referred for paroxysmal event with description consistent with PNES. Also has underlying episodic migraine headache w/o status, and episodic tension headaches. Denies any further "seizure" activity. Cannot recall last migraine. Now c/o "regular" HA that occur daily. 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 Anxiety "stable", no recent panic attacks. Not sleeping well, may average 4-5 hrs of sleep nightly.     # PNES  [] call PCP and request referral to [facility] mental health services for anxiety    # Episodic Migraine Headache w/o Aura  [] patient does not meet criteria for monthly CGRP inhibitor biologics.  [] c/w Maxalt 10mg BID PRN  [] c/w Effexor XR to 75mg daily  [] c/w tizanidine 2mg PO BID PRN neck pain/HA  [] Handout on meditation provided    # Insomnia  [] start trazodone 50mg 1/2 tab nightly PRN    RTC 3 months    Headache education provided - including good sleep hygiene, stress management, medication overuse education provided - using more 3 OTC per week may worsen headaches, High intensity interval training has shown to reduce headache frequency. Low carb, high protein has shown to reduce headache frequency. Patient is instructed to keep headache diary.    I have explained the treatment plan, diagnosis, and prognosis to the patient, caretaker, family. All questions are answered to the best of my knowledge.    Face to Face Time 30 minute, including, " counseling, education, review of test results, relevant medical records, and coordination of care.

## 2023-11-07 ENCOUNTER — TELEPHONE (OUTPATIENT)
Dept: OPHTHALMOLOGY | Facility: CLINIC | Age: 23
End: 2023-11-07

## 2023-11-07 ENCOUNTER — OFFICE VISIT (OUTPATIENT)
Dept: OPHTHALMOLOGY | Facility: CLINIC | Age: 23
End: 2023-11-07
Payer: MEDICAID

## 2023-11-07 VITALS — WEIGHT: 201.94 LBS | HEIGHT: 69 IN | BODY MASS INDEX: 29.91 KG/M2

## 2023-11-07 DIAGNOSIS — H47.11 PAPILLEDEMA ASSOCIATED WITH INCREASED INTRACRANIAL PRESSURE: ICD-10-CM

## 2023-11-07 DIAGNOSIS — H04.123 DRY EYE SYNDROME OF BOTH EYES: ICD-10-CM

## 2023-11-07 DIAGNOSIS — H47.10 OPTIC DISC EDEMA: Primary | ICD-10-CM

## 2023-11-07 DIAGNOSIS — H46.9 UNSPECIFIED OPTIC NEURITIS: ICD-10-CM

## 2023-11-07 DIAGNOSIS — H53.8 BLURRED VISION, LEFT EYE: ICD-10-CM

## 2023-11-07 PROCEDURE — 99214 OFFICE O/P EST MOD 30 MIN: CPT | Mod: PBBFAC,PN

## 2023-11-07 PROCEDURE — 92133 CPTRZD OPH DX IMG PST SGM ON: CPT | Mod: PBBFAC,PN | Performed by: OPHTHALMOLOGY

## 2023-11-07 PROCEDURE — 92250 FUNDUS PHOTOGRAPHY W/I&R: CPT | Mod: PBBFAC,PN | Performed by: OPHTHALMOLOGY

## 2023-11-07 PROCEDURE — 92083 EXTENDED VISUAL FIELD XM: CPT | Mod: PBBFAC,PN | Performed by: OPHTHALMOLOGY

## 2023-11-07 PROCEDURE — 92133 CPTRZD OPH DX IMG PST SGM ON: CPT | Mod: PBBFAC,PN

## 2023-11-07 PROCEDURE — 92083 EXTENDED VISUAL FIELD XM: CPT | Mod: PBBFAC,PN

## 2023-11-07 RX ORDER — PHENYLEPH/TROPICAMIDE IN WATER 2.5 %-1 %
1 DROPS OPHTHALMIC (EYE) ONCE
Status: COMPLETED | OUTPATIENT
Start: 2023-11-07 | End: 2023-11-07

## 2023-11-07 RX ADMIN — Medication 1 DROP: at 08:11

## 2023-11-07 NOTE — PROGRESS NOTES
HPI     Blurred Vision     Additional comments: Patient states that vision has been blurry for many   years. Saw optometrist at [facility] eye clinic about 1 year ago, they gave   her a glasses prescription but patient states that glasses do not help at   all.            Migraine     Additional comments: Sees neurology            Glare      Additional comments: Patient states that while driving at night glare is   extremely bothersome, sees star burst            Tearing      Additional comments: Patient states that eyes tearing involuntarily daily              Comments    Blurred Vision, Migraine Headache, glare bothersome          Last edited by Tisha Madison MA on 11/7/2023  7:59 AM.        OCT RNFL 11/07/2023   OD: 4/10 signal strength, 179 average RNFL thickness, S/N/I thickening   OS: 6/10 signal strength, 115 average RNFL thickness, I thickening     HVF Central 24-2 Threshold Test 11/07/2023  OD: 1/10 fixation losses, 0% false positives, 7% false negatives, enlarged blind spot  OS: 1/11 fixation losses, 0% false positives, 0% false negatives, full, minor defect near blind spot    Assessment /Plan     For exam results, see Encounter Report.    Optic disc edema  -     MRI Brain W WO Contrast; Future; Expected date: 11/07/2023  -     MRV Brain Without Contrast; Future; Expected date: 11/07/2023  -     Ambulatory referral/consult to Nutrition Services; Future; Expected date: 11/14/2023    Blurred vision, left eye  -     Ambulatory referral/consult to Ophthalmology  -     tropicamide /PHENYLephrine opthalmic solution 1 drop    Unspecified optic neuritis  -     MRI Orbits W W/O Contrast; Future; Expected date: 11/07/2023      Optic disc edema, bilateral   - Noted on initial exam here 11/7/23  - Patient with history of migraines since age of 14, not improving with Effexor; also with TVOs, diplopia; no tinnitus, nausea, vomiting  - Had a 40 lbs weight gain after birth control depot - last around 2021  - Initial  weight 11/7/23: 197 lbs, target 20 lbs weight loss  - OCT nerve 11/7/23: 179 // 115  - HVF 24-2 1/7/23: enlarged blind spot // full   - Ordered MRI brain with and without contracts, MRV brain, MRI orbits with and without contrast with fat suppression   - Will call patient with next steps (likely LP) after MRI is completed  - Placed nutrition referral 11/7/23    2. Astigmatism, both eyes  - BCVA 20/20 OU 11/7/23, MRx provided    3. Dry eye syndrome both eyes  - Artificial tears QID OU    RTC to be determined                DISCHARGE

## 2023-11-07 NOTE — TELEPHONE ENCOUNTER
11/07/2023 10:13 AM  Called central scheduling to make patient's appointments for two MRIs and MRV. There was an opening at [facility] on Friday 11/17/2023 with an arrival time of 8:00 am. Patient can not eat 4 hours prior to appointment.     I tried to call patient to let her know when the appointment is but she did not answer. I left her a message.

## 2023-11-17 ENCOUNTER — HOSPITAL ENCOUNTER (OUTPATIENT)
Dept: RADIOLOGY | Facility: HOSPITAL | Age: 23
Discharge: HOME | End: 2023-11-17
Payer: MEDICAID

## 2023-11-17 DIAGNOSIS — H46.9 UNSPECIFIED OPTIC NEURITIS: ICD-10-CM

## 2023-11-17 DIAGNOSIS — H47.10 OPTIC DISC EDEMA: ICD-10-CM

## 2023-11-17 NOTE — PROGRESS NOTES
Ms Rabago was here for her mri of the brain.We attempted to do the test multiple times but she was extremely claustrophobic. I will try and get in touch we Dr office to make them aware and get her rescheduled at another facility with a bigger magnet. I suggested her to call Dr to get medication to help her relax.

## 2023-11-30 ENCOUNTER — OFFICE VISIT (OUTPATIENT)
Dept: NEUROLOGY | Facility: CLINIC | Age: 23
End: 2023-11-30
Payer: MEDICAID

## 2023-11-30 VITALS
SYSTOLIC BLOOD PRESSURE: 117 MMHG | BODY MASS INDEX: 30.51 KG/M2 | WEIGHT: 206 LBS | HEART RATE: 99 BPM | OXYGEN SATURATION: 98 % | DIASTOLIC BLOOD PRESSURE: 78 MMHG | HEIGHT: 69 IN

## 2023-11-30 DIAGNOSIS — G43.109 MIGRAINE WITH AURA AND WITHOUT STATUS MIGRAINOSUS, NOT INTRACTABLE: Primary | ICD-10-CM

## 2023-11-30 DIAGNOSIS — E66.9 CLASS 1 OBESITY WITH SERIOUS COMORBIDITY AND BODY MASS INDEX (BMI) OF 30.0 TO 30.9 IN ADULT, UNSPECIFIED OBESITY TYPE: ICD-10-CM

## 2023-11-30 DIAGNOSIS — H47.10 PAPILLEDEMA OF BOTH EYES: ICD-10-CM

## 2023-11-30 DIAGNOSIS — F44.5 PSYCHOGENIC NONEPILEPTIC SEIZURE: ICD-10-CM

## 2023-11-30 DIAGNOSIS — F41.9 ANXIETY: ICD-10-CM

## 2023-11-30 PROBLEM — E66.811 CLASS 1 OBESITY WITH SERIOUS COMORBIDITY AND BODY MASS INDEX (BMI) OF 30.0 TO 30.9 IN ADULT: Status: ACTIVE | Noted: 2023-11-30

## 2023-11-30 PROCEDURE — 3074F PR MOST RECENT SYSTOLIC BLOOD PRESSURE < 130 MM HG: ICD-10-PCS | Mod: CPTII,,, | Performed by: NURSE PRACTITIONER

## 2023-11-30 PROCEDURE — 3008F PR BODY MASS INDEX (BMI) DOCUMENTED: ICD-10-PCS | Mod: CPTII,,, | Performed by: NURSE PRACTITIONER

## 2023-11-30 PROCEDURE — 3078F PR MOST RECENT DIASTOLIC BLOOD PRESSURE < 80 MM HG: ICD-10-PCS | Mod: CPTII,,, | Performed by: NURSE PRACTITIONER

## 2023-11-30 PROCEDURE — 3078F DIAST BP <80 MM HG: CPT | Mod: CPTII,,, | Performed by: NURSE PRACTITIONER

## 2023-11-30 PROCEDURE — 99214 PR OFFICE/OUTPT VISIT, EST, LEVL IV, 30-39 MIN: ICD-10-PCS | Mod: S$PBB,,, | Performed by: NURSE PRACTITIONER

## 2023-11-30 PROCEDURE — 99214 OFFICE O/P EST MOD 30 MIN: CPT | Mod: PBBFAC | Performed by: NURSE PRACTITIONER

## 2023-11-30 PROCEDURE — 3074F SYST BP LT 130 MM HG: CPT | Mod: CPTII,,, | Performed by: NURSE PRACTITIONER

## 2023-11-30 PROCEDURE — 1159F PR MEDICATION LIST DOCUMENTED IN MEDICAL RECORD: ICD-10-PCS | Mod: CPTII,,, | Performed by: NURSE PRACTITIONER

## 2023-11-30 PROCEDURE — 1159F MED LIST DOCD IN RCRD: CPT | Mod: CPTII,,, | Performed by: NURSE PRACTITIONER

## 2023-11-30 PROCEDURE — 1160F PR REVIEW ALL MEDS BY PRESCRIBER/CLIN PHARMACIST DOCUMENTED: ICD-10-PCS | Mod: CPTII,,, | Performed by: NURSE PRACTITIONER

## 2023-11-30 PROCEDURE — 1160F RVW MEDS BY RX/DR IN RCRD: CPT | Mod: CPTII,,, | Performed by: NURSE PRACTITIONER

## 2023-11-30 PROCEDURE — 3008F BODY MASS INDEX DOCD: CPT | Mod: CPTII,,, | Performed by: NURSE PRACTITIONER

## 2023-11-30 PROCEDURE — 99214 OFFICE O/P EST MOD 30 MIN: CPT | Mod: S$PBB,,, | Performed by: NURSE PRACTITIONER

## 2023-11-30 RX ORDER — RIZATRIPTAN BENZOATE 10 MG/1
10 TABLET ORAL
Qty: 12 TABLET | Refills: 2 | Status: SHIPPED | OUTPATIENT
Start: 2023-11-30

## 2023-11-30 RX ORDER — VENLAFAXINE HYDROCHLORIDE 37.5 MG/1
37.5 CAPSULE, EXTENDED RELEASE ORAL DAILY
Qty: 30 CAPSULE | Refills: 11 | Status: SHIPPED | OUTPATIENT
Start: 2023-11-30 | End: 2024-11-29

## 2023-11-30 RX ORDER — TOPIRAMATE 50 MG/1
50 TABLET, FILM COATED ORAL NIGHTLY
Qty: 30 TABLET | Refills: 11 | Status: SHIPPED | OUTPATIENT
Start: 2023-11-30 | End: 2024-03-04 | Stop reason: SDUPTHER

## 2023-11-30 RX ORDER — DIAZEPAM 2 MG/1
2 TABLET ORAL ONCE
Qty: 1 TABLET | Refills: 0 | Status: SHIPPED | OUTPATIENT
Start: 2023-11-30 | End: 2023-12-25 | Stop reason: SDUPTHER

## 2023-11-30 NOTE — PROGRESS NOTES
"[facility] Neurology Follow Up Office Visit Note  Subjective:      Patient ID: Carolina Rabago is a 23 y.o. female.    Chief Complaint: Migraine (Patient reports migraines are improving. ) and Insomnia (Patient reports some improvement, but still has trouble falling asleep.)    HPI   This is a 23 year old right handed female with PMHX of ADHD, migraine headache disorder, who was referred for paroxysmal events X1. Pt was last seen on 8/30/2023. During that visit, Effexor 75mg Qday, Maxalt 10mg PO BID PRN and tizanidine 2mg PO BID PRN were continued. Trazodone 50mg 1/2 tab nightly for insomnia was initiated. Pt was referred to Dr. Irvin for blurred vision.    Today, Pt states HA as not as intense, but frequency is the same. Worse during menstrual cycle. Has been evaluated by Dr. Irvin (ophthalmology) who dx her with bilat optic disc edema, ordered imaging that is rescheduled for this Sunday.. Took Tylenol with relief These type of HA occur almost daily. Not sleeping well at this time. Is currently enrolled in a biology class. Work schedule is not consistent. May work days or nights. Averages 4-5 hours of sleep nightly. Does not feel rested.    Also admits to decreased dietary intake, states she does not feel hungry. May have a bagel for breakfast then eats a "full" lunch. Also has HA that starts in lower neck area and radiates to head and eyes on occasion. May take ibuprofen 800mg or Tylenol ES 2 tabs PRN with minimal relief. Tizanidine effective for neck pain and HA. Maxalt effective as abortive therapy for migraine. Cannot recall last migraine. Melatonin causes HA. Has also tried clonidine, did not like the way it made her feel.    Denies any further "seizure" activity. 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 States anxiety is stable. Some days better than others. Has not had a panic attack in a few months. Not exacerbated by thing that she can think of.    Paroxsysmal Event  Age of Onset - 21, " one time event    Semiology - headache followed by LOC. possibly shaking. Mom has witnessed the event but did not elaborate on the details of the event.    Frequency - last event 10/2020    Provocation Factors - headache    Risk Factors -  - Family history of seizures? denied  - History of focal CNS lesions? denied  - History of CNS infection? denied  - History of Head Trauma with prolonged LOC? denied  - History of childhood seizures, including febrile seizures? denied  - History of developmental delay? denied  - History of underlying mood disorder? ADHD, taking Adderall during the day  - History of sleep disorder? not sleeping well at night  - Illicit drug use? denied  - Alcohol use? denied    Workup -  - routine EE2021 - Normal routine awake and drowsy EEG (read by Dr. Hernández)  - extended 1 hour EEG:  - ambulatory 72-96 hr EEG:  - EMU video EEG:  - MRI Brain:  - NCHCT:  - SPECT Brain:    Current AEDs -  none    Prior AEDs -  none    Headache Disorder  Age of Onset - teenage years    Semiology -  1. Holocephalic, pounding, severe, lasting 24 hrs, with n/v, w/ photo and phonophobia. - 3 MHD/Month  2. Bitemporal, dull, constant, mild, w/o nausea, w/ photophobia, not impeding day to day activity - 1 headache days per month    Pre-CGRP Inhibitor Frequency - 4 MGD/month    Exacerbating Factors - stress.    Current ppx meds -  Effexor ER 37.5mg (2022 - present) effective    Current abortive meds -  Maxalt 10mg PO PRN migraine     Prior ppx meds -  TPM 50mg daily - anxiety  Amitriptyline 50mg qhs - weight gain, sedation. Only takes PRN.  Emgality 120mg/mL once per month    Prior abortive meds -   Sumatriptan PRN - ineffective     Review of Systems    Constitutional: no fever, fatigue, weakness  Eye: no vision loss, eye redness, drainage, or pain  ENMT: no sore throat, ear pain, sinus pain/congestion, nasal congestion/drainage  Respiratory: no cough, no wheezing, no shortness of breath  Cardiovascular: no chest  pain, no palpitations, no edema  Gastrointestinal: no nausea, vomiting, or diarrhea. No abdominal pain  Genitourinary: no dysuria, no urinary frequency or urgency, no hematuria  Hema/Lymph: no abnormal bruising or bleeding  Endocrine: no heat or cold intolerance, no excessive thirst or excessive urination  Musculoskeletal: no muscle or joint pain, no joint swelling  Integumentary: no skin rash or abnormal lesion  Psychiatric: no depressed mood, no anxiety, no visual/auditory hallucinations, no delusions, no suicidal or homicidal ideation  Neurologic: as per HPI    Objective:      Physical Exam    General: well-developed well-nourished in no acute distress  Eye: clear conjunctiva, eyelids normal  HENT: oropharynx without erythema/exudate, oropharynx and nasal mucosal surfaces moist, no maxillary/frontal sinus tenderness to palpation  Neck: full range of motion, no thyromegaly or lymphadenopathy  Respiratory: clear to auscultation bilaterally  Cardiovascular: regular rate and rhythm   Gastrointestinal: non-distended  Musculoskeletal: full range of motion of all extremities/spine without limitation or discomfort  Integumentary: no rashes or skin lesions present    Neurologic:  Mental Status- Alert and Oriented to time, self, place, Speech is fluent, with intact repetition, naming, reading, and comprehension, long term memory intact, No Dysarthria.  CN II-XII - CN I Deferred, LIZETT, no RAPD, VA grossly normal to finger counting at 6ft, No ptosis b/l, EOMI w/o nystagmus, LT/PP symmetric, intact in CN V1-V3 distribution b/l, masseter symmetric b/l, T/U midline, Shoulder shrug symmetric b/l, Hearing grossly intact b/l, VFFC, Face Symmetric.  Motor - Tone and Bulk nml throughout, No involuntary movements, 5/5 to confrontation throughout, FFM nml b/l, No pronator drift.  Sensory - LT/PP/Temp/Proprioception/Vibration symmetric intact throughout.  Reflexes - 2+ Throughout  Cerebellar Exam - FNF wnl b/l no intention  "tremor.  Romberg - negative.  Gait - Normal, toe gait, heel gait and tandem gait intact, bilat arm swing intaact    Assessment:       1. Migraine with aura and without status migrainosus, not intractable  - venlafaxine (EFFEXOR-XR) 37.5 MG 24 hr capsule; Take 1 capsule (37.5 mg total) by mouth once daily.  Dispense: 30 capsule; Refill: 11  - rizatriptan (MAXALT) 10 MG tablet; Take 1 tablet (10 mg total) by mouth as needed for Migraine.  Dispense: 12 tablet; Refill: 2  - topiramate (TOPAMAX) 50 MG tablet; Take 1 tablet (50 mg total) by mouth every evening.  Dispense: 30 tablet; Refill: 11    2. Psychogenic nonepileptic seizure    3. Anxiety    4. Class 1 obesity with serious comorbidity and body mass index (BMI) of 30.0 to 30.9 in adult, unspecified obesity type    5. Papilledema of both eyes      Plan:       This is a 23 year old right handed female with PMHX of ADHD, Migraine headache disorder, Anxiety disorder who was referred for paroxysmal event with description consistent with PNES. Continues w/episodic migraine headache w/o status, and episodic tension headaches. Denies any further "seizure" activity. Cannot recall last migraine. Now c/o "regular" HA that occur daily that are less intense. Migraines worse during menstrual cycle. Last migraine last month. Relieved with Tylenol, wasn't aware to take triptan at onset of migraine. Anxiety "stable", no recent panic attacks. Tried trazodone for sleep, c/o oversedation in AM, no longer taking. Evaluated by Dr. Irvin for blurred vision, dx with bilat papilledema. Imaging rescheduled for this weekend.     # PNES  [] call PCP and request referral to [facility] mental health services for anxiety    # Episodic Migraine Headache w/o Aura  [] patient does not meet criteria for monthly CGRP inhibitor biologics.  [] c/w Maxalt 10mg BID PRN  [] decrease Effexor XR to 37.5 mg daily  [] start TPM 50mg nightly for migraine and weight loss  [] c/w tizanidine 2mg PO BID PRN neck " pain/HA  [] Handout on meditation provided    # Insomnia  [] d/c trazodone 50mg 1/2 tab nightly PRN    RTC 3 months    Headache education provided - including good sleep hygiene, stress management, medication overuse education provided - using more 3 OTC per week may worsen headaches, High intensity interval training has shown to reduce headache frequency. Low carb, high protein has shown to reduce headache frequency. Patient is instructed to keep headache diary.    I have explained the treatment plan, diagnosis, and prognosis to the patient, caretaker, family. All questions are answered to the best of my knowledge.    Face to Face Time 30 minute, including, counseling, education, collaboration w/MD, review of test results, relevant medical records, and coordination of care.

## 2023-12-27 RX ORDER — DIAZEPAM 2 MG/1
2 TABLET ORAL ONCE
Qty: 1 TABLET | Refills: 0 | Status: SHIPPED | OUTPATIENT
Start: 2023-12-27 | End: 2024-03-04

## 2023-12-28 ENCOUNTER — HOSPITAL ENCOUNTER (OUTPATIENT)
Dept: RADIOLOGY | Facility: HOSPITAL | Age: 23
Discharge: HOME | End: 2023-12-28
Payer: MEDICAID

## 2023-12-28 PROCEDURE — 25500020 PHARM REV CODE 255

## 2023-12-28 PROCEDURE — 70544 MR ANGIOGRAPHY HEAD W/O DYE: CPT | Mod: TC

## 2023-12-28 PROCEDURE — 70553 MRI BRAIN STEM W/O & W/DYE: CPT | Mod: TC

## 2023-12-28 PROCEDURE — A9577 INJ MULTIHANCE: HCPCS

## 2023-12-28 RX ADMIN — GADOBENATE DIMEGLUMINE 19 ML: 529 INJECTION, SOLUTION INTRAVENOUS at 09:12

## 2023-12-29 ENCOUNTER — TELEPHONE (OUTPATIENT)
Dept: OPHTHALMOLOGY | Facility: CLINIC | Age: 23
End: 2023-12-29
Payer: MEDICAID

## 2023-12-29 DIAGNOSIS — H47.11 PAPILLEDEMA ASSOCIATED WITH INCREASED INTRACRANIAL PRESSURE: Primary | ICD-10-CM

## 2023-12-29 NOTE — PROGRESS NOTES
Assessment /Plan     For exam results, see Encounter Report.    Papilledema associated with increased intracranial pressure  -     Miscellaneous Test, Sendout Angiotensin Converting Enzyme; Future; Expected date: 12/29/2023  -     Cell Count, CSF; Future; Expected date: 12/30/2023  -     Cerebrospinal Fluid Culture; Future; Expected date: 12/29/2023  -     Glucose, CSF; Future; Expected date: 12/29/2023  -     IR LUMBAR PUNCTURE DIAGNOSTIC WITH IMAGING; Future; Expected date: 12/29/2023  -     Ms Profile; Future; Expected date: 12/29/2023  -     Ms Profile Blood Collection; Future; Expected date: 12/29/2023  -     Protein, CSF; Future; Expected date: 12/29/2023  -     VDRL, CSF; Future; Expected date: 12/29/2023      I called the patient to review the MRI/MRV results.  Also to inform her that I have ordered an LP.  I left a message asking her to return my call.

## 2023-12-29 NOTE — TELEPHONE ENCOUNTER
I called the patient again.  I informed her that the MRI and MRV were WNL.  I noticed that she is on topamax. I asked her to stop taking it until aft the LP has been performed.  She stated that she would.  She can resume right after the LP is completed.  Ophthalmology will call with the LP results.

## 2024-01-04 ENCOUNTER — TELEPHONE (OUTPATIENT)
Dept: INTERVENTIONAL RADIOLOGY/VASCULAR | Facility: HOSPITAL | Age: 24
End: 2024-01-04
Payer: MEDICAID

## 2024-01-04 NOTE — TELEPHONE ENCOUNTER
Pt scheduled for her LP on 1/8  @ 10:00. She is aware to have a  and that she will spend an hour in One Day Stay post procedure. She also will get labs drawn before procedure.

## 2024-01-08 ENCOUNTER — HOSPITAL ENCOUNTER (OUTPATIENT)
Dept: INTERVENTIONAL RADIOLOGY/VASCULAR | Facility: HOSPITAL | Age: 24
Discharge: HOME | End: 2024-01-08
Attending: OPHTHALMOLOGY
Payer: MEDICAID

## 2024-01-08 VITALS
RESPIRATION RATE: 18 BRPM | SYSTOLIC BLOOD PRESSURE: 111 MMHG | DIASTOLIC BLOOD PRESSURE: 72 MMHG | OXYGEN SATURATION: 100 % | TEMPERATURE: 98 F | HEART RATE: 71 BPM

## 2024-01-08 DIAGNOSIS — H47.11 PAPILLEDEMA ASSOCIATED WITH INCREASED INTRACRANIAL PRESSURE: ICD-10-CM

## 2024-01-08 LAB
APPEARANCE CSF: CLEAR
COLOR CSF: COLORLESS
GLUCOSE CSF-MCNC: 63 MG/DL (ref 40–70)
PROT CSF-MCNC: 15.6 MG/DL (ref 15–45)
RBC # CSF MANUAL: 0 /UL
SPECIMEN VOL CSF: 1.5 ML
WBC # CSF MANUAL: 1 /UL (ref 0–5)

## 2024-01-08 PROCEDURE — 82164 ANGIOTENSIN I ENZYME TEST: CPT | Performed by: OPHTHALMOLOGY

## 2024-01-08 PROCEDURE — 84157 ASSAY OF PROTEIN OTHER: CPT

## 2024-01-08 PROCEDURE — 82945 GLUCOSE OTHER FLUID: CPT

## 2024-01-08 PROCEDURE — 87070 CULTURE OTHR SPECIMN AEROBIC: CPT

## 2024-01-08 PROCEDURE — 86592 SYPHILIS TEST NON-TREP QUAL: CPT

## 2024-01-08 PROCEDURE — 83521 IG LIGHT CHAINS FREE EACH: CPT

## 2024-01-08 PROCEDURE — 62328 DX LMBR SPI PNXR W/FLUOR/CT: CPT

## 2024-01-08 PROCEDURE — 89051 BODY FLUID CELL COUNT: CPT

## 2024-01-08 RX ORDER — SODIUM CHLORIDE 0.9 % (FLUSH) 0.9 %
10 SYRINGE (ML) INJECTION
OUTPATIENT
Start: 2024-01-08

## 2024-01-08 NOTE — LETTER
January 8, 2024      [facility]  [address] [address]  Phone: [phone]  Fax: [phone]       Patient: Carolina Rabago   YOB: 2000  Date of Visit: 01/08/2024    To Whom It May Concern:    Sherita Rabago  was at [facility] on 01/08/2024. The patient may return to work on 01/15/2024. If you have any questions or concerns, or if I can be of further assistance, please do not hesitate to contact me.    Sincerely,    Samantha Durbin RN

## 2024-01-08 NOTE — DISCHARGE INSTRUCTIONS
`Take it easy for the next 24 hours.  Resume home medications unless otherwise instructed by your doctor.     ` Be sure to stay hydrated.      `No bending, straining or or heavy lifting over 15 pounds for 1 week.      `You may remove your bandaid in 24 hours and shower then.      `Notify MD if you are running fever over 100.4, see any reddness or foul smelling discharge from biopsy area.      `Go to your nearest ER or call 911 if you have any difficulty breathing or notice swelling or bleeding in your neck. Thank you for choosing [facility] for your care and it was my pleasure taking care of you.  [phone]

## 2024-01-10 LAB — VDRL CSF QL: NEGATIVE

## 2024-01-11 LAB
KAPPA LC FREE CSF-MCNC: <0.0083 MG/DL
M ADDITIONAL SAMPLE FOR REFLEX OLIGS: NORMAL

## 2024-01-12 LAB — ACE CSF-CCNC: 1.2 U/L (ref 0–2.5)

## 2024-01-13 LAB
BACTERIA CSF CULT: NORMAL
GRAM STN SPEC: NORMAL

## 2024-01-14 ENCOUNTER — HOSPITAL ENCOUNTER (EMERGENCY)
Facility: HOSPITAL | Age: 24
Discharge: HOME | End: 2024-01-14
Attending: EMERGENCY MEDICINE
Payer: MEDICAID

## 2024-01-14 VITALS
WEIGHT: 206 LBS | TEMPERATURE: 98 F | HEIGHT: 69 IN | OXYGEN SATURATION: 97 % | DIASTOLIC BLOOD PRESSURE: 77 MMHG | HEART RATE: 97 BPM | SYSTOLIC BLOOD PRESSURE: 119 MMHG | RESPIRATION RATE: 16 BRPM | BODY MASS INDEX: 30.51 KG/M2

## 2024-01-14 DIAGNOSIS — R51.9 INTRACTABLE EPISODIC HEADACHE, UNSPECIFIED HEADACHE TYPE: Primary | ICD-10-CM

## 2024-01-14 LAB
ALBUMIN SERPL-MCNC: 4.3 G/DL (ref 3.5–5)
ALBUMIN/GLOB SERPL: 1.1 RATIO (ref 1.1–2)
ALP SERPL-CCNC: 79 UNIT/L (ref 40–150)
ALT SERPL-CCNC: 17 UNIT/L (ref 0–55)
APPEARANCE UR: CLEAR
AST SERPL-CCNC: 14 UNIT/L (ref 5–34)
B-HCG UR QL: NEGATIVE
BACTERIA #/AREA URNS AUTO: ABNORMAL /HPF
BASOPHILS # BLD AUTO: 0.03 X10(3)/MCL
BASOPHILS NFR BLD AUTO: 0.3 %
BILIRUB SERPL-MCNC: 0.7 MG/DL
BILIRUB UR QL STRIP.AUTO: NEGATIVE
BUN SERPL-MCNC: 10.8 MG/DL (ref 7–18.7)
CALCIUM SERPL-MCNC: 9.6 MG/DL (ref 8.4–10.2)
CHLORIDE SERPL-SCNC: 104 MMOL/L (ref 98–107)
CO2 SERPL-SCNC: 24 MMOL/L (ref 22–29)
COLOR UR AUTO: ABNORMAL
CREAT SERPL-MCNC: 0.98 MG/DL (ref 0.55–1.02)
CTP QC/QA: YES
EOSINOPHIL # BLD AUTO: 0.02 X10(3)/MCL (ref 0–0.9)
EOSINOPHIL NFR BLD AUTO: 0.2 %
ERYTHROCYTE [DISTWIDTH] IN BLOOD BY AUTOMATED COUNT: 14.1 % (ref 11.5–17)
GFR SERPLBLD CREATININE-BSD FMLA CKD-EPI: >60 MLS/MIN/1.73/M2
GLOBULIN SER-MCNC: 4 GM/DL (ref 2.4–3.5)
GLUCOSE SERPL-MCNC: 96 MG/DL (ref 74–100)
GLUCOSE UR QL STRIP.AUTO: NORMAL
HCT VFR BLD AUTO: 42.4 % (ref 37–47)
HGB BLD-MCNC: 13.5 G/DL (ref 12–16)
HOLD SPECIMEN: NORMAL
HYALINE CASTS #/AREA URNS LPF: ABNORMAL /LPF
IMM GRANULOCYTES # BLD AUTO: 0.02 X10(3)/MCL (ref 0–0.04)
IMM GRANULOCYTES NFR BLD AUTO: 0.2 %
KETONES UR QL STRIP.AUTO: NEGATIVE
LEUKOCYTE ESTERASE UR QL STRIP.AUTO: NEGATIVE
LYMPHOCYTES # BLD AUTO: 1.54 X10(3)/MCL (ref 0.6–4.6)
LYMPHOCYTES NFR BLD AUTO: 16.6 %
MCH RBC QN AUTO: 25.1 PG (ref 27–31)
MCHC RBC AUTO-ENTMCNC: 31.8 G/DL (ref 33–36)
MCV RBC AUTO: 78.8 FL (ref 80–94)
MONOCYTES # BLD AUTO: 1 X10(3)/MCL (ref 0.1–1.3)
MONOCYTES NFR BLD AUTO: 10.8 %
NEUTROPHILS # BLD AUTO: 6.69 X10(3)/MCL (ref 2.1–9.2)
NEUTROPHILS NFR BLD AUTO: 71.9 %
NITRITE UR QL STRIP.AUTO: NEGATIVE
NRBC BLD AUTO-RTO: 0 %
PH UR STRIP.AUTO: 6.5 [PH]
PLATELET # BLD AUTO: 472 X10(3)/MCL (ref 130–400)
PMV BLD AUTO: 9.2 FL (ref 7.4–10.4)
POTASSIUM SERPL-SCNC: 3.3 MMOL/L (ref 3.5–5.1)
PROT SERPL-MCNC: 8.3 GM/DL (ref 6.4–8.3)
PROT UR QL STRIP.AUTO: NEGATIVE
RBC # BLD AUTO: 5.38 X10(6)/MCL (ref 4.2–5.4)
RBC #/AREA URNS AUTO: ABNORMAL /HPF
RBC UR QL AUTO: NEGATIVE
SODIUM SERPL-SCNC: 140 MMOL/L (ref 136–145)
SP GR UR STRIP.AUTO: 1.02 (ref 1–1.03)
SQUAMOUS #/AREA URNS LPF: ABNORMAL /HPF
UROBILINOGEN UR STRIP-ACNC: NORMAL
WBC # SPEC AUTO: 9.3 X10(3)/MCL (ref 4.5–11.5)
WBC #/AREA URNS AUTO: ABNORMAL /HPF

## 2024-01-14 PROCEDURE — 81025 URINE PREGNANCY TEST: CPT | Performed by: EMERGENCY MEDICINE

## 2024-01-14 PROCEDURE — 81001 URINALYSIS AUTO W/SCOPE: CPT | Performed by: EMERGENCY MEDICINE

## 2024-01-14 PROCEDURE — 85025 COMPLETE CBC W/AUTO DIFF WBC: CPT | Performed by: EMERGENCY MEDICINE

## 2024-01-14 PROCEDURE — 96361 HYDRATE IV INFUSION ADD-ON: CPT

## 2024-01-14 PROCEDURE — 96375 TX/PRO/DX INJ NEW DRUG ADDON: CPT

## 2024-01-14 PROCEDURE — 63600175 PHARM REV CODE 636 W HCPCS: Performed by: EMERGENCY MEDICINE

## 2024-01-14 PROCEDURE — 99284 EMERGENCY DEPT VISIT MOD MDM: CPT | Mod: 25

## 2024-01-14 PROCEDURE — 80053 COMPREHEN METABOLIC PANEL: CPT | Performed by: EMERGENCY MEDICINE

## 2024-01-14 PROCEDURE — 96374 THER/PROPH/DIAG INJ IV PUSH: CPT

## 2024-01-14 RX ORDER — KETOROLAC TROMETHAMINE 30 MG/ML
15 INJECTION, SOLUTION INTRAMUSCULAR; INTRAVENOUS
Status: COMPLETED | OUTPATIENT
Start: 2024-01-14 | End: 2024-01-14

## 2024-01-14 RX ORDER — HYDROMORPHONE HYDROCHLORIDE 1 MG/ML
1 INJECTION, SOLUTION INTRAMUSCULAR; INTRAVENOUS; SUBCUTANEOUS
Status: COMPLETED | OUTPATIENT
Start: 2024-01-14 | End: 2024-01-14

## 2024-01-14 RX ORDER — PROCHLORPERAZINE EDISYLATE 5 MG/ML
10 INJECTION INTRAMUSCULAR; INTRAVENOUS
Status: COMPLETED | OUTPATIENT
Start: 2024-01-14 | End: 2024-01-14

## 2024-01-14 RX ADMIN — SODIUM CHLORIDE, POTASSIUM CHLORIDE, SODIUM LACTATE AND CALCIUM CHLORIDE 2000 ML: 600; 310; 30; 20 INJECTION, SOLUTION INTRAVENOUS at 11:01

## 2024-01-14 RX ADMIN — KETOROLAC TROMETHAMINE 15 MG: 30 INJECTION, SOLUTION INTRAMUSCULAR; INTRAVENOUS at 11:01

## 2024-01-14 RX ADMIN — HYDROMORPHONE HYDROCHLORIDE 1 MG: 1 INJECTION, SOLUTION INTRAMUSCULAR; INTRAVENOUS; SUBCUTANEOUS at 11:01

## 2024-01-14 RX ADMIN — PROCHLORPERAZINE EDISYLATE 10 MG: 5 INJECTION INTRAMUSCULAR; INTRAVENOUS at 11:01

## 2024-01-14 NOTE — ED PROVIDER NOTES
Encounter Date: 1/14/2024       History     Chief Complaint   Patient presents with    Headache     Had spinal tap on Monday, c/o spinal headache, was seen at  and dx with spinal headache and states rx meds not working     Underlying history significant headaches, Neurology notes reviewed.  Apparent history of seizure versus pseudo-seizure.  Recently diagnosed with bilateral papilledema, underwent outpatient evaluation, notes reviewed.  Negative MRI and MRV of the brain, underwent lumbar puncture by Interventional Radiology (Dr. Evens Chaudhari - 1/12), opening pressure 26 cm, all spinal fluid studies available for review appear normal.  She believes she is having ongoing spinal headache superimposed on her other headache syndromes, oral caffeine and Norco have not given adequate relief.  She feels that her headache is generally worse, she has some persistent pain in her neck, a funny taste in her mouth, and her ears are ringing.  Here with family.  Symptoms somewhat improved lying supine but worse with sitting or standing.  She believes she will need a blood patch which has already been discussed with her.  No fever or neck stiffness.  She does have a follow-up neurology appointment and is expecting further ophthalmology follow-up by phone.  No other complaints.     The history is provided by the patient and a relative. No  was used.     Review of patient's allergies indicates:  No Known Allergies  Past Medical History:   Diagnosis Date    Headache     Mental disorder     Seizures      Past Surgical History:   Procedure Laterality Date    WISDOM TOOTH EXTRACTION N/A      Family History   Problem Relation Age of Onset    Diabetes Mother     Migraines Maternal Aunt     Cancer Maternal Grandmother     Aneurysm Neg Hx     Clotting disorder Neg Hx     Dementia Neg Hx     Fainting Neg Hx     Heart disease Neg Hx     Hyperlipidemia Neg Hx     Kidney disease Neg Hx     Liver disease Neg Hx     Neuropathy  Neg Hx     Obesity Neg Hx     Parkinsonism Neg Hx     Seizures Neg Hx     Stroke Neg Hx     Tremor Neg Hx      Social History     Tobacco Use    Smoking status: Never    Smokeless tobacco: Never   Substance Use Topics    Alcohol use: Not Currently    Drug use: Never     Review of Systems   Constitutional:  Negative for activity change, fatigue and fever.   HENT:  Negative for congestion, ear pain, facial swelling, nosebleeds, sinus pressure and sore throat.    Eyes:  Negative for pain, discharge, redness and visual disturbance.   Respiratory:  Negative for cough, choking, chest tightness, shortness of breath and wheezing.    Cardiovascular:  Negative for chest pain, palpitations and leg swelling.   Gastrointestinal:  Negative for abdominal distention, abdominal pain, nausea and vomiting.   Endocrine: Negative for heat intolerance, polydipsia and polyuria.   Genitourinary:  Negative for difficulty urinating, dysuria, flank pain, hematuria and urgency.   Musculoskeletal:  Positive for neck pain. Negative for back pain, gait problem, joint swelling and myalgias.   Skin:  Negative for color change and rash.   Allergic/Immunologic: Negative for environmental allergies and food allergies.   Neurological:  Positive for headaches. Negative for dizziness, weakness and numbness.        Orthostatic headache exacerbation; funny taste in mouth; ears ringing   Hematological:  Negative for adenopathy. Does not bruise/bleed easily.   Psychiatric/Behavioral:  Negative for agitation and behavioral problems. The patient is not nervous/anxious.    All other systems reviewed and are negative.      Physical Exam     Initial Vitals [01/14/24 1048]   BP Pulse Resp Temp SpO2   131/87 (!) 112 16 97.5 °F (36.4 °C) 100 %      MAP       --         Physical Exam    Nursing note and vitals reviewed.  Constitutional: She appears well-developed and well-nourished. She is not diaphoretic. She appears distressed.   Mild distress   HENT:   Head:  Normocephalic and atraumatic.   Mouth/Throat: No oropharyngeal exudate.   Eyes: Conjunctivae and EOM are normal. Pupils are equal, round, and reactive to light. Right eye exhibits no discharge. Left eye exhibits no discharge. No scleral icterus.   Neck: Neck supple. No thyromegaly present. No tracheal deviation present. No JVD present.   Normal range of motion.  Cardiovascular:  Normal rate, regular rhythm, normal heart sounds and intact distal pulses.     Exam reveals no gallop and no friction rub.       No murmur heard.  Orthostatic tachycardia noted with sitting up   Pulmonary/Chest: Breath sounds normal. No stridor. No respiratory distress. She has no wheezes. She has no rhonchi. She has no rales. She exhibits no tenderness.   Abdominal: Abdomen is soft. Bowel sounds are normal. She exhibits no distension and no mass. There is no abdominal tenderness. There is no rebound and no guarding.   Musculoskeletal:         General: No tenderness or edema. Normal range of motion.      Cervical back: Normal range of motion and neck supple.     Neurological: She is alert and oriented to person, place, and time. She has normal strength.   No meningismus   Skin: Skin is warm and dry. No rash and no abscess noted. No erythema.   Psychiatric: She has a normal mood and affect. Her behavior is normal. Judgment and thought content normal.         ED Course   Procedures  Labs Reviewed   COMPREHENSIVE METABOLIC PANEL - Abnormal; Notable for the following components:       Result Value    Potassium Level 3.3 (*)     Globulin 4.0 (*)     All other components within normal limits   URINALYSIS, REFLEX TO URINE CULTURE - Abnormal; Notable for the following components:    Bacteria, UA Trace (*)     Squamous Epithelial Cells, UA Trace (*)     All other components within normal limits   CBC WITH DIFFERENTIAL - Abnormal; Notable for the following components:    MCV 78.8 (*)     MCH 25.1 (*)     MCHC 31.8 (*)     Platelet 472 (*)     All  other components within normal limits   CBC W/ AUTO DIFFERENTIAL    Narrative:     The following orders were created for panel order CBC auto differential.  Procedure                               Abnormality         Status                     ---------                               -----------         ------                     CBC with Differential[[id number]]       Abnormal            Final result                 Please view results for these tests on the individual orders.   EXTRA TUBES    Narrative:     The following orders were created for panel order EXTRA TUBES.  Procedure                               Abnormality         Status                     ---------                               -----------         ------                     Light Blue Top Hold[[id number]]                             In process                 Gold Top Hold[[id number]]                                   In process                 Pink Top Hold[[id number]]                                   In process                   Please view results for these tests on the individual orders.   LIGHT BLUE TOP HOLD   GOLD TOP HOLD   PINK TOP HOLD   POCT URINE PREGNANCY          Imaging Results    None          Medications   lactated ringers bolus 2,000 mL (2,000 mLs Intravenous New Bag 1/14/24 1155)   HYDROmorphone injection 1 mg (1 mg Intravenous Given 1/14/24 1155)   prochlorperazine injection Soln 10 mg (10 mg Intravenous Given 1/14/24 1155)   ketorolac injection 15 mg (15 mg Intravenous Given 1/14/24 1155)       1:49 PM Significant improvement.  Has received about 1.5 L IV fluids, heart rate upper 90s.  Counseled patient and family in detail.  There is a significant orthostatic change in heart rate noted on arrival, I suspect she may well have a component of post spinal headache but she is still appropriate for aggressive outpatient management, blood patch is still a consideration but not emergent.  Recommend supine position, push oral hydration  with electrolyte containing fluids, continue usual medicines, follow-up with neurology in the short-term as scheduled, follow-up with Interventional Radiology if still symptomatic in 1 or 2 days.      Medical Decision Making  Problems Addressed:  Intractable episodic headache, unspecified headache type: acute illness or injury    Amount and/or Complexity of Data Reviewed  External Data Reviewed: notes.  Labs: ordered. Decision-making details documented in ED Course.    Risk  Prescription drug management.  Decision regarding hospitalization.      Additional MDM:   Differential Diagnosis:   Tension headache, post spinal headache, migraine, other forms of headache.                                    Clinical Impression:  Final diagnoses:  [R51.9] Intractable episodic headache, unspecified headache type (Primary)          ED Disposition Condition    Discharge Stable          ED Prescriptions    None       Follow-up Information       Follow up With Specialties Details Why Contact Info    [facility] - Emergency Dept Emergency Medicine  As needed [address] [facility] [address] [phone]    Gely Valladares APRN Nurse Practitioner Schedule an appointment as soon as possible for a visit   [address] [facility] [address] [phone]               Evens Leung MD  01/14/24 7218

## 2024-01-14 NOTE — DISCHARGE INSTRUCTIONS
As discussed, this may reflect a post LP headache, and may still require a blood patch.      In the meantime, relief is usually obtained with spending time as much as possible lying down and pushing fluids with electrolytes on a very regular basis.    Continue usual medicines and Neurology follow up.    If still having symptoms by Monday or Tuesday this week, call early in the day for Dr. Evens Chaudhari of Interventional Radiology and let him know about your symptoms for possible clinic evaluation.  [phone], or the contact number as listed on your discharge instructions dated 1/12 if different.    Return as needed/ if worse.

## 2024-02-17 ENCOUNTER — HOSPITAL ENCOUNTER (EMERGENCY)
Facility: HOSPITAL | Age: 24
Discharge: HOME | End: 2024-02-17
Attending: EMERGENCY MEDICINE
Payer: COMMERCIAL

## 2024-02-17 VITALS
TEMPERATURE: 98 F | RESPIRATION RATE: 17 BRPM | HEART RATE: 75 BPM | DIASTOLIC BLOOD PRESSURE: 83 MMHG | BODY MASS INDEX: 29.77 KG/M2 | OXYGEN SATURATION: 100 % | WEIGHT: 201 LBS | SYSTOLIC BLOOD PRESSURE: 120 MMHG | HEIGHT: 69 IN

## 2024-02-17 DIAGNOSIS — S39.012A BACK STRAIN, INITIAL ENCOUNTER: ICD-10-CM
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
## 2024-02-17 DIAGNOSIS — S16.1XXA STRAIN OF NECK MUSCLE, INITIAL ENCOUNTER: Primary | ICD-10-CM

## 2024-02-17 PROCEDURE — 25000003 PHARM REV CODE 250: Performed by: NURSE PRACTITIONER

## 2024-02-17 PROCEDURE — 99284 EMERGENCY DEPT VISIT MOD MDM: CPT | Mod: 25

## 2024-02-17 RX ORDER — INDOMETHACIN 25 MG/1
25 CAPSULE ORAL 2 TIMES DAILY PRN
Qty: 14 CAPSULE | Refills: 0 | Status: SHIPPED | OUTPATIENT
Start: 2024-02-17 | End: 2024-03-04

## 2024-02-17 RX ORDER — BACLOFEN 10 MG/1
10 TABLET ORAL 3 TIMES DAILY PRN
Qty: 21 TABLET | Refills: 0 | Status: SHIPPED | OUTPATIENT
Start: 2024-02-17 | End: 2024-03-04

## 2024-02-17 RX ORDER — METHOCARBAMOL 500 MG/1
1000 TABLET, FILM COATED ORAL
Status: COMPLETED | OUTPATIENT
Start: 2024-02-17 | End: 2024-02-17

## 2024-02-17 RX ADMIN — METHOCARBAMOL 1000 MG: 500 TABLET ORAL at 03:02

## 2024-02-17 NOTE — Clinical Note
"Carolina Galankiersten Rabago was seen and treated in our emergency department on 2/17/2024.  She may return to work on 02/20/2024.       If you have any questions or concerns, please don't hesitate to call.      Evens Soares Jr., FNP"
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
## 2024-03-04 ENCOUNTER — OFFICE VISIT (OUTPATIENT)
Dept: NEUROLOGY | Facility: CLINIC | Age: 24
End: 2024-03-04
Payer: MEDICAID

## 2024-03-04 VITALS
BODY MASS INDEX: 29.77 KG/M2 | OXYGEN SATURATION: 99 % | DIASTOLIC BLOOD PRESSURE: 76 MMHG | HEART RATE: 94 BPM | HEIGHT: 69 IN | WEIGHT: 201 LBS | SYSTOLIC BLOOD PRESSURE: 115 MMHG

## 2024-03-04 DIAGNOSIS — F44.5 PSYCHOGENIC NONEPILEPTIC SEIZURE: ICD-10-CM

## 2024-03-04 DIAGNOSIS — G47.00 INSOMNIA, UNSPECIFIED TYPE: ICD-10-CM

## 2024-03-04 DIAGNOSIS — G93.2 IIH (IDIOPATHIC INTRACRANIAL HYPERTENSION): ICD-10-CM

## 2024-03-04 DIAGNOSIS — G43.109 MIGRAINE WITH AURA AND WITHOUT STATUS MIGRAINOSUS, NOT INTRACTABLE: Primary | ICD-10-CM

## 2024-03-04 PROCEDURE — 1160F RVW MEDS BY RX/DR IN RCRD: CPT | Mod: CPTII,,, | Performed by: NURSE PRACTITIONER

## 2024-03-04 PROCEDURE — 1159F MED LIST DOCD IN RCRD: CPT | Mod: CPTII,,, | Performed by: NURSE PRACTITIONER

## 2024-03-04 PROCEDURE — 3078F DIAST BP <80 MM HG: CPT | Mod: CPTII,,, | Performed by: NURSE PRACTITIONER

## 2024-03-04 PROCEDURE — 3008F BODY MASS INDEX DOCD: CPT | Mod: CPTII,,, | Performed by: NURSE PRACTITIONER

## 2024-03-04 PROCEDURE — 99214 OFFICE O/P EST MOD 30 MIN: CPT | Mod: PBBFAC | Performed by: NURSE PRACTITIONER

## 2024-03-04 PROCEDURE — G2211 COMPLEX E/M VISIT ADD ON: HCPCS | Mod: S$PBB,,, | Performed by: NURSE PRACTITIONER

## 2024-03-04 PROCEDURE — 3074F SYST BP LT 130 MM HG: CPT | Mod: CPTII,,, | Performed by: NURSE PRACTITIONER

## 2024-03-04 PROCEDURE — 99215 OFFICE O/P EST HI 40 MIN: CPT | Mod: S$PBB,,, | Performed by: NURSE PRACTITIONER

## 2024-03-04 RX ORDER — TOPIRAMATE 50 MG/1
50 TABLET, FILM COATED ORAL 2 TIMES DAILY
Qty: 60 TABLET | Refills: 6 | Status: SHIPPED | OUTPATIENT
Start: 2024-03-04 | End: 2025-03-04

## 2024-03-04 NOTE — PROGRESS NOTES
"[facility] Neurology Follow Up Office Visit Note  Subjective:      Patient ID: Carolina Rabago is a 23 y.o. female.    Chief Complaint: Migraine (Patient reports migraines are improving.) and Seizures (Patient denies any seizure like activity since last visit.)    HPI   This is a 23 year old right handed female with PMHX of ADHD, migraine headache disorder, who was referred for paroxysmal events X1. Pt was last seen on 11/302023. During that visit, Effexor was decreased to 37.5mg Qday, Maxalt 10mg PO BID PRN and tizanidine 2mg PO BID PRN were continued. TPM 50mg QHS was initiated. Trazodone was discontinued. Presented to ED on 1/12/2024 for spinal HA after LP. 1/8/2024 opening pressure 27cm H2O    Today, Pt states HA have improved since last visit. May average 5 HA per month, but not migraine in nature. Taking TPM 50mg nightly. Worse during menstrual cycle. Followed by Dr. Irvin (ophthalmology) papilledema. Took Ibuprofen 200mg with relief. Not sleeping well at this time.  Averages 4-5 hours of sleep nightly. Does not feel rested.    Denies any further "seizure" activity. 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 States anxiety is stable. Some days better than others. Has not had a panic attack in a few months. Not exacerbated by thing that she can think of.    Paroxsysmal Event  Age of Onset - 21, one time event    Semiology - headache followed by LOC. possibly shaking. Mom has witnessed the event but did not elaborate on the details of the event.    Frequency - last event 10/2020    Provocation Factors - headache    Risk Factors -  - Family history of seizures? denied  - History of focal CNS lesions? denied  - History of CNS infection? denied  - History of Head Trauma with prolonged LOC? denied  - History of childhood seizures, including febrile seizures? denied  - History of developmental delay? denied  - History of underlying mood disorder? ADHD, taking Adderall during the day  - History of " sleep disorder? not sleeping well at night  - Illicit drug use? denied  - Alcohol use? denied    Workup -  - routine EE2021 - Normal routine awake and drowsy EEG (read by Dr. 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 Holocephalic, pounding, severe, lasting 24 hrs, with n/v, w/ photo and phonophobia. - 3 MHD/Month  2. Bitemporal, dull, constant, mild, w/o nausea, w/ photophobia, not impeding day to day activity - 1 headache days per month    Pre-CGRP Inhibitor Frequency - 4 MGD/month    Exacerbating Factors - stress.    Current ppx meds -  Effexor ER 37.5mg (2022 - present) effective    Current abortive meds -  Maxalt 10mg PO PRN migraine     Prior ppx meds -  TPM 50mg daily - anxiety  Amitriptyline 50mg qhs - weight gain, sedation. Only takes PRN.  Emgality 120mg/mL once per month    Prior abortive meds -   Sumatriptan PRN - ineffective   Tizanidine 2mg PO TID PRN    Review of Systems    Constitutional: no fever, fatigue, weakness  Eye: no vision loss, eye redness, drainage, or pain  ENMT: no sore throat, ear pain, sinus pain/congestion, nasal congestion/drainage  Respiratory: no cough, no wheezing, no shortness of breath  Cardiovascular: no chest pain, no palpitations, no edema  Gastrointestinal: no nausea, vomiting, or diarrhea. No abdominal pain  Genitourinary: no dysuria, no urinary frequency or urgency, no hematuria  Hema/Lymph: no abnormal bruising or bleeding  Endocrine: no heat or cold intolerance, no excessive thirst or excessive urination  Musculoskeletal: no muscle or joint pain, no joint  swelling  Integumentary: no skin rash or abnormal lesion  Psychiatric: no depressed mood, no anxiety, no visual/auditory hallucinations, no delusions, no suicidal or homicidal ideation  Neurologic: as per HPI    Objective:      Physical Exam    General: well-developed well-nourished in no acute distress  Eye: clear conjunctiva, eyelids normal  HENT: oropharynx without erythema/exudate, oropharynx and nasal mucosal surfaces moist, no maxillary/frontal sinus tenderness to palpation  Neck: full range of motion, no thyromegaly or lymphadenopathy  Respiratory: clear to auscultation bilaterally  Cardiovascular: regular rate and rhythm   Gastrointestinal: non-distended  Musculoskeletal: full range of motion of all extremities/spine without limitation or discomfort  Integumentary: no rashes or skin lesions present    Neurologic:  Mental Status- Alert and Oriented to time, self, place, Speech is fluent, with intact repetition, naming, reading, and comprehension, long term memory intact, No Dysarthria.  CN II-XII - CN I Deferred, LIZETT, no RAPD, VA grossly normal to finger counting at 6ft, No ptosis b/l, EOMI w/o nystagmus, LT/PP symmetric, intact in CN V1-V3 distribution b/l, masseter symmetric b/l, T/U midline, Shoulder shrug symmetric b/l, Hearing grossly intact b/l, VFFC, Face Symmetric.  Motor - Tone and Bulk nml throughout, No involuntary movements, 5/5 to confrontation throughout, FFM nml b/l, No pronator drift.  Sensory - LT/PP/Temp/Proprioception/Vibration symmetric intact throughout.  Reflexes - 2+ Throughout  Cerebellar Exam - FNF wnl b/l no intention tremor.  Romberg - negative.  Gait - Normal, toe gait, heel gait and tandem gait intact, bilat arm swing intaact    Assessment:       1. Migraine with aura and without status migrainosus, not intractable  - topiramate (TOPAMAX) 50 MG tablet; Take 1 tablet (50 mg total) by mouth 2 (two) times daily.  Dispense: 60 tablet; Refill: 6    2. Psychogenic nonepileptic  "seizure    3. Insomnia, unspecified type    4. IIH (idiopathic intracranial hypertension)  - topiramate (TOPAMAX) 50 MG tablet; Take 1 tablet (50 mg total) by mouth 2 (two) times daily.  Dispense: 60 tablet; Refill: 6      Plan:       This is a 23 year old right handed female with PMHX of ADHD, Migraine headache disorder, Anxiety disorder who was referred for paroxysmal event with description consistent with PNES. Continues w/episodic migraine headache w/o status, and episodic tension headaches. Denies any further "seizure" activity. Cannot recall last migraine. Notes regular HA have improved to 5/month. Resolved w/Ibuprofen 200mg. Has had imaging and LP ordered by Dr. Irvin for bilaterally papilledema. Opening pressure 27 mm H2O. Taking Trazodone 25mg nightly for insomnia.    # PNES  [] call PCP and request referral to [facility] mental health services for anxiety    # Episodic Migraine Headache w/o Aura  [] patient does not meet criteria for monthly CGRP inhibitor biologics.  [] c/w Maxalt 10mg BID PRN  [] c/w Effexor XR to 37.5 mg daily  [] increase TPM 50mg tp BID for migraine and weight loss    # Insomnia  [] c/w trazodone 25mg nightly     RTC 6 months - TM okay    Headache education provided - including good sleep hygiene, stress management, medication overuse education provided - using more 3 OTC per week may worsen headaches, High intensity interval training has shown to reduce headache frequency. Low carb, high protein has shown to reduce headache frequency. Patient is instructed to keep headache diary.    I have explained the treatment plan, diagnosis, and prognosis to the patient, caretaker, family. All questions are answered to the best of my knowledge.    Face to Face Time 40 minute, including, counseling, education, collaboration w/MD, review of test results, relevant medical records, and coordination of care.                "

## 2024-03-04 NOTE — LETTER
March 4, 2024      [facility]  [address] [address]  Phone: [phone]       Patient: Carolina Rabago   YOB: 2000  Date of Visit: 03/04/2024    To Whom It May Concern:    Sherita Rabago  was at [facility] on 03/04/2024. The patient may return to work/school on 03/04/2024 with no restrictions. If you have any questions or concerns, or if I can be of further assistance, please do not hesitate to contact me.    Sincerely,    Annette Brooks MA

## 2024-04-01 ENCOUNTER — OFFICE VISIT (OUTPATIENT)
Dept: OPHTHALMOLOGY | Facility: CLINIC | Age: 24
End: 2024-04-01
Payer: MEDICAID

## 2024-04-01 VITALS — HEIGHT: 69 IN | WEIGHT: 196 LBS | BODY MASS INDEX: 29.03 KG/M2

## 2024-04-01 DIAGNOSIS — H47.11 PAPILLEDEMA ASSOCIATED WITH INCREASED INTRACRANIAL PRESSURE: Primary | ICD-10-CM

## 2024-04-01 PROCEDURE — 99213 OFFICE O/P EST LOW 20 MIN: CPT | Mod: PBBFAC,PN | Performed by: STUDENT IN AN ORGANIZED HEALTH CARE EDUCATION/TRAINING PROGRAM

## 2024-04-01 PROCEDURE — 92133 CPTRZD OPH DX IMG PST SGM ON: CPT | Mod: PBBFAC,PN | Performed by: OPHTHALMOLOGY

## 2024-04-01 RX ORDER — METOPROLOL SUCCINATE 50 MG/1
50 TABLET, EXTENDED RELEASE ORAL 2 TIMES DAILY
COMMUNITY
Start: 2024-03-04

## 2024-04-01 RX ORDER — ACETAZOLAMIDE 500 MG/1
500 CAPSULE, EXTENDED RELEASE ORAL 2 TIMES DAILY
Qty: 60 CAPSULE | Refills: 6 | Status: SHIPPED | OUTPATIENT
Start: 2024-04-01 | End: 2025-04-01

## 2024-04-01 NOTE — PROGRESS NOTES
HPI    Pt states that her OD gets blurry. It is worse when staring at her   computer  Last edited by Divina Mcneill LPN on 4/1/2024  2:06 PM.            Assessment /Plan     For exam results, see Encounter Report.    Papilledema associated with increased intracranial pressure    Other orders  -     acetaZOLAMIDE (DIAMOX) 500 mg CpSR; Take 1 capsule (500 mg total) by mouth 2 (two) times daily.  Dispense: 60 capsule; Refill: 6                 Optos 4/1/24: OD media clear, nerve 1+ edema, macula flat, periphery flat // OS media clear, nerve p/s small, macula flat, periphery flat      OCT RNFL 11/07/2023   OD: 4/10 signal strength, 179 average RNFL thickness, S/N/I thickening   OS: 6/10 signal strength, 115 average RNFL thickness, I thickening    4/1/24: 186 white w/ inf artifact // 113 white I else green     [facility] 24-2 Threshold Test 11/07/2023  OD: 1/10 fixation losses, 0% false positives, 7% false negatives, enlarged blind spot  OS: 1/11 fixation losses, 0% false positives, 0% false negatives, full, minor defect near blind spot    Papilledema  - Noted on initial exam here 11/7/23  - Patient with history of migraines since age of 14  - Initial weight 11/7/23: 197 lbs, target 20 lbs weight loss  - OCT nerve 11/7/23: 179 // 115   4/1/24: 186 white w/ inf artifact // 113 white I else green   - HVF 24-2 1/7/23: enlarged blind spot // full   - MRI/MRV 12/23 wnl per read  - LP 12/23: OP 27, negative labs  - Optos 4/1/24  - 4/1/24: workup negative except slightly elevated OP. Did not tolerate topamax. Still with mild positional HA, left sided pulsatile tinnitus, rare TVOs (couple times yearly). Discussed weight loss goal 10%. Will start diamox 500mg BID PO, first week daily.    2. Astigmatism, both eyes  - BCVA 20/20 OU 11/7/23, MRx provided    3. Dry eye syndrome both eyes  - Artificial tears QID OU    RTC 2 mo DFE, optos, rnfl

## 2024-06-06 ENCOUNTER — OFFICE VISIT (OUTPATIENT)
Dept: OPHTHALMOLOGY | Facility: CLINIC | Age: 24
End: 2024-06-06

## 2024-06-06 VITALS — WEIGHT: 198 LBS | BODY MASS INDEX: 29.33 KG/M2 | HEIGHT: 69 IN

## 2024-06-06 DIAGNOSIS — H04.123 DRY EYE SYNDROME OF BOTH EYES: ICD-10-CM

## 2024-06-06 DIAGNOSIS — G93.2 IDIOPATHIC INTRACRANIAL HYPERTENSION: Primary | ICD-10-CM

## 2024-06-06 PROCEDURE — 92133 CPTRZD OPH DX IMG PST SGM ON: CPT | Mod: PBBFAC,PN | Performed by: OPHTHALMOLOGY

## 2024-06-06 PROCEDURE — 92133 CPTRZD OPH DX IMG PST SGM ON: CPT | Mod: PBBFAC,PN

## 2024-06-06 PROCEDURE — 99213 OFFICE O/P EST LOW 20 MIN: CPT | Mod: PBBFAC,PN,25

## 2024-06-06 NOTE — PROGRESS NOTES
Assessment /Plan     OCT RNFL 11/07/2023   OD: 4/10 signal strength, 179 average RNFL thickness, S/N/I thickening   OS: 6/10 signal strength, 115 average RNFL thickness, I thickening    4/1/24: 186 white w/ inf artifact // 113 white I else green     OCT RNFL 6/6/24  - OD: 216, all white  - OS: 115, white I    [facility] 24-2 Threshold Test 11/07/2023  OD: 1/10 fixation losses, 0% false positives, 7% false negatives, enlarged blind spot  OS: 1/11 fixation losses, 0% false positives, 0% false negatives, full, minor defect near blind spot    1. Idiopathic intracranial hypertension  - Noted on initial exam here 11/7/23  - Patient with history of migraines since age of 14  - Initial weight 11/7/23: 197 lbs, target 20 lbs weight loss  - OCT nerve 11/7/23: 179 // 115   4/1/24: 186 white w/ inf artifact // 113 white I else green   - HVF 24-2 1/7/23: enlarged blind spot // full   - MRI/MRV 12/23 wnl per read  - LP 12/23: OP 27, negative labs.   - Did not tolerate topamax. On diamox 500mg BID  - 4/1/24: Optos completed. Symptomatic with mild positional HA, left sided pulsatile tinnitus, rare TVOs (couple times yearly). Started diamox 500mg BID.  - 6/6/24: Optos completed. Still symptomatic with occasional headaches, denies tinnitus. Has been compliant on diamox 500mg BID, no weight changes. Per pt, diet consists of a lot of pizza. With increased disc edema 2+//1+, will increase diamox to 500mg TID. Sent referral to dietician. If not improved next visit, consider inflammatory workup    2. Astigmatism, both eyes  - BCVA 20/20 OU 11/7/23, MRx provided    3. Dry eye syndrome both eyes  - Artificial tears QID OU    RTC 6 weeks DFE, OCT RNFL, and HVF

## 2024-06-06 NOTE — LETTER
June 6, 2024      [facility]  [address] [place] [address]  Phone: [phone]       Patient: Carolina Rabago   YOB: 2000  Date of Visit: 06/06/2024    To Whom It May Concern:    Sherita Rabago  was at [facility] on 06/06/2024. The patient may return to work/school on 06/06/2024 with no restrictions. If you have any questions or concerns, or if I can be of further assistance, please do not hesitate to contact me.    Sincerely,    Huong Morin MA

## 2024-08-17 ENCOUNTER — OFFICE VISIT (OUTPATIENT)
Dept: URGENT CARE | Facility: CLINIC | Age: 24
End: 2024-08-17
Payer: COMMERCIAL

## 2024-08-17 VITALS
SYSTOLIC BLOOD PRESSURE: 124 MMHG | BODY MASS INDEX: 29.33 KG/M2 | HEIGHT: 69 IN | RESPIRATION RATE: 18 BRPM | OXYGEN SATURATION: 100 % | HEART RATE: 100 BPM | WEIGHT: 198 LBS | DIASTOLIC BLOOD PRESSURE: 68 MMHG | TEMPERATURE: 99 F

## 2024-08-17 DIAGNOSIS — N30.01 ACUTE CYSTITIS WITH HEMATURIA: ICD-10-CM

## 2024-08-17 DIAGNOSIS — R30.0 DYSURIA: Primary | ICD-10-CM

## 2024-08-17 LAB
B-HCG UR QL: NEGATIVE
BILIRUBIN, UA POC OHS: NEGATIVE
BLOOD, UA POC OHS: ABNORMAL
CLARITY, UA POC OHS: ABNORMAL
COLOR, UA POC OHS: YELLOW
CTP QC/QA: YES
GLUCOSE, UA POC OHS: NEGATIVE
KETONES, UA POC OHS: NEGATIVE
LEUKOCYTES, UA POC OHS: ABNORMAL
NITRITE, UA POC OHS: NEGATIVE
PH, UA POC OHS: 6.5
PROTEIN, UA POC OHS: 30
SPECIFIC GRAVITY, UA POC OHS: 1.02
UROBILINOGEN, UA POC OHS: 0.2

## 2024-08-17 PROCEDURE — 81025 URINE PREGNANCY TEST: CPT | Mod: S$GLB,,, | Performed by: NURSE PRACTITIONER

## 2024-08-17 PROCEDURE — 81003 URINALYSIS AUTO W/O SCOPE: CPT | Mod: QW,S$GLB,, | Performed by: NURSE PRACTITIONER

## 2024-08-17 PROCEDURE — 99213 OFFICE O/P EST LOW 20 MIN: CPT | Mod: S$GLB,,, | Performed by: NURSE PRACTITIONER

## 2024-08-17 RX ORDER — NITROFURANTOIN 25; 75 MG/1; MG/1
100 CAPSULE ORAL 2 TIMES DAILY
Qty: 10 CAPSULE | Refills: 0 | Status: SHIPPED | OUTPATIENT
Start: 2024-08-17 | End: 2024-08-22

## 2024-08-17 RX ORDER — PHENAZOPYRIDINE HYDROCHLORIDE 100 MG/1
100 TABLET, FILM COATED ORAL 3 TIMES DAILY PRN
Qty: 15 TABLET | Refills: 0 | Status: SHIPPED | OUTPATIENT
Start: 2024-08-17 | End: 2024-08-22

## 2024-08-17 NOTE — PROGRESS NOTES
"Subjective:      Patient ID: Carolina Rabago is a 24 y.o. female.    Vitals:  height is 5' 9" (1.753 m) and weight is 89.8 kg (198 lb). Her oral temperature is 98.7 °F (37.1 °C). Her blood pressure is 124/68 and her pulse is 100. Her respiration is 18 and oxygen saturation is 100%.     Chief Complaint: Urinary Frequency    UTI symptoms began 8/15/24. Frequent urination, burning, lower back pain and urgency. 8/10 on the pain scale. She has chronic UTI's, at least 3 or more a year.     Urinary Frequency   This is a new problem. The current episode started gradual onset. The problem occurs every urination. The problem has been unchanged. The quality of the pain is described as aching and burning. The pain is at a severity of 8/10. The pain is moderate. There has been no fever. She is Sexually active. There is No history of pyelonephritis. Associated symptoms include frequency, hesitancy and urgency.       Genitourinary:  Positive for frequency and urgency.      Objective:     Physical Exam   Constitutional: She is oriented to person, place, and time. She appears well-developed. She is cooperative.   HENT:   Head: Normocephalic and atraumatic.   Ears:   Right Ear: Hearing, tympanic membrane, external ear and ear canal normal.   Left Ear: Hearing, tympanic membrane, external ear and ear canal normal.   Nose: Nose normal. No mucosal edema or nasal deformity. No epistaxis. Right sinus exhibits no maxillary sinus tenderness and no frontal sinus tenderness. Left sinus exhibits no maxillary sinus tenderness and no frontal sinus tenderness.   Mouth/Throat: Uvula is midline, oropharynx is clear and moist and mucous membranes are normal. No trismus in the jaw. Normal dentition. No uvula swelling.   Eyes: Conjunctivae and lids are normal.   Neck: Trachea normal and phonation normal. Neck supple.   Cardiovascular: Normal rate, regular rhythm, normal heart sounds and normal pulses.   Pulmonary/Chest: Effort normal and breath " sounds normal.   Abdominal: Normal appearance and bowel sounds are normal. Soft. There is no left CVA tenderness and no right CVA tenderness.   Musculoskeletal: Normal range of motion.         General: Normal range of motion.   Neurological: She is alert and oriented to person, place, and time. She exhibits normal muscle tone.   Skin: Skin is warm, dry and intact.   Psychiatric: Her speech is normal and behavior is normal. Judgment and thought content normal.   Nursing note and vitals reviewed.      Assessment:     1. Dysuria    2. Acute cystitis with hematuria        Plan:       Results for orders placed or performed in visit on 08/17/24   POCT Urinalysis(Instrument)   Result Value Ref Range    Color, POC UA Yellow Yellow, Straw, Colorless    Clarity, POC UA Slight Cloudy (A) Clear    Glucose, POC UA Negative Negative    Bilirubin, POC UA Negative Negative    Ketones, POC UA Negative Negative    Spec Grav POC UA 1.020 1.005 - 1.030    Blood, POC UA Moderate (A) Negative    pH, POC UA 6.5 5.0 - 8.0    Protein, POC UA 30 (A) Negative    Urobilinogen, POC UA 0.2 <=1.0    Nitrite, POC UA Negative Negative    WBC, POC UA Large (A) Negative   POCT urine pregnancy   Result Value Ref Range    POC Preg Test, Ur Negative Negative     Acceptable Yes          Dysuria  -     POCT Urinalysis(Instrument)  -     POCT urine pregnancy  -     nitrofurantoin, macrocrystal-monohydrate, (MACROBID) 100 MG capsule; Take 1 capsule (100 mg total) by mouth 2 (two) times daily. for 5 days  Dispense: 10 capsule; Refill: 0  -     phenazopyridine (PYRIDIUM) 100 MG tablet; Take 1 tablet (100 mg total) by mouth 3 (three) times daily as needed for Pain.  Dispense: 15 tablet; Refill: 0    Acute cystitis with hematuria  -     nitrofurantoin, macrocrystal-monohydrate, (MACROBID) 100 MG capsule; Take 1 capsule (100 mg total) by mouth 2 (two) times daily. for 5 days  Dispense: 10 capsule; Refill: 0  -     phenazopyridine (PYRIDIUM) 100 MG  "tablet; Take 1 tablet (100 mg total) by mouth 3 (three) times daily as needed for Pain.  Dispense: 15 tablet; Refill: 0      Patient Instructions   Bladder Infection, Female (Adult)    Urine is normally doesn't have any bacteria in it. But bacteria can get into the urinary tract from the skin around the rectum. Or they can travel in the blood from elsewhere in the body. Once they are in your urinary tract, they can cause infection in the urethra (urethritis), the bladder (cystitis), or the kidneys (pyelonephritis).  The most common place for an infection is in the bladder. This is called a bladder infection. This is one of the most common infections in women. Most bladder infections are easily treated. They are not serious unless the infection spreads to the kidney.  The phrases "bladder infection," "UTI," and "cystitis" are often used to describe the same thing. But they are not always the same. Cystitis is an inflammation of the bladder. The most common cause of cystitis is an infection.  Symptoms  The infection causes inflammation in the urethra and bladder. This causes many of the symptoms. The most common symptoms of a bladder infection are:  Pain or burning when urinating  Having to urinate more often than usual  Urgent need to urinate  Only a small amount of urine comes out  Blood in urine  Abdominal discomfort. This is usually in the lower abdomen above the pubic bone.  Cloudy urine  Strong- or bad-smelling urine  Unable to urinate (urinary retention)  Unable to hold urine in (urinary incontinence)  Fever  Loss of appetite  Confusion (in older adults)    UTI Relief   - Increase water intake.  - Decrease sodas, tea, coffee and energy drinks until symptoms resolve.  - Cranberry products are thought to protect against UTI by inhibiting bacterial growth and adhesion to the bladder.  - Tylenol (acetaminophen) and/or NSAIDS (motrin, ibuprofen) as needed for discomfort.  - Timed voiding every 2-3 hours ( void every " 2-3 hours even if you do not feel the urge).  - OTC AZO/pyridium if symptoms are persistent - this will turn your urine red/orange. This will help with symptomatic relief, but will not treat the infection.  - Avoid tight fitting cloths, douching, tampon use.  - Wipe front to back.   - Void prior to and after intercourse.   - Use probiotics especially with any antibiotic therapy.  Patient aware and verbalized understanding.      INSTRUCTIONS:  - Rest.  - Drink plenty of fluids.  - Take Tylenol and/or Ibuprofen as directed as needed for fever/pain.  Do not take more than the recommended dose.  - follow up with your PCP within the next 1-2 weeks as needed.  - You must understand that you have received an Urgent Care treatment only and that you may be released before all of your medical problems are known or treated.   - You, the patient, will arrange for follow up care as instructed.   - If your condition worsens or fails to improve we recommend that you receive another evaluation at the ER immediately or contact your PCP to discuss your concerns.   - You can call [phone] or [phone] to help schedule an appointment with the appropriate provider.     -If you smoke cigarettes, it would be beneficial for you to stop.

## 2024-08-17 NOTE — PATIENT INSTRUCTIONS
"Bladder Infection, Female (Adult)    Urine is normally doesn't have any bacteria in it. But bacteria can get into the urinary tract from the skin around the rectum. Or they can travel in the blood from elsewhere in the body. Once they are in your urinary tract, they can cause infection in the urethra (urethritis), the bladder (cystitis), or the kidneys (pyelonephritis).  The most common place for an infection is in the bladder. This is called a bladder infection. This is one of the most common infections in women. Most bladder infections are easily treated. They are not serious unless the infection spreads to the kidney.  The phrases "bladder infection," "UTI," and "cystitis" are often used to describe the same thing. But they are not always the same. Cystitis is an inflammation of the bladder. The most common cause of cystitis is an infection.  Symptoms  The infection causes inflammation in the urethra and bladder. This causes many of the symptoms. The most common symptoms of a bladder infection are:  Pain or burning when urinating  Having to urinate more often than usual  Urgent need to urinate  Only a small amount of urine comes out  Blood in urine  Abdominal discomfort. This is usually in the lower abdomen above the pubic bone.  Cloudy urine  Strong- or bad-smelling urine  Unable to urinate (urinary retention)  Unable to hold urine in (urinary incontinence)  Fever  Loss of appetite  Confusion (in older adults)    UTI Relief   - Increase water intake.  - Decrease sodas, tea, coffee and energy drinks until symptoms resolve.  - Cranberry products are thought to protect against UTI by inhibiting bacterial growth and adhesion to the bladder.  - Tylenol (acetaminophen) and/or NSAIDS (motrin, ibuprofen) as needed for discomfort.  - Timed voiding every 2-3 hours ( void every 2-3 hours even if you do not feel the urge).  - OTC AZO/pyridium if symptoms are persistent - this will turn your urine red/orange. This will help " with symptomatic relief, but will not treat the infection.  - Avoid tight fitting cloths, douching, tampon use.  - Wipe front to back.   - Void prior to and after intercourse.   - Use probiotics especially with any antibiotic therapy.  Patient aware and verbalized understanding.      INSTRUCTIONS:  - Rest.  - Drink plenty of fluids.  - Take Tylenol and/or Ibuprofen as directed as needed for fever/pain.  Do not take more than the recommended dose.  - follow up with your PCP within the next 1-2 weeks as needed.  - You must understand that you have received an Urgent Care treatment only and that you may be released before all of your medical problems are known or treated.   - You, the patient, will arrange for follow up care as instructed.   - If your condition worsens or fails to improve we recommend that you receive another evaluation at the ER immediately or contact your PCP to discuss your concerns.   - You can call [phone] or [phone] to help schedule an appointment with the appropriate provider.     -If you smoke cigarettes, it would be beneficial for you to stop.

## 2024-08-27 ENCOUNTER — PATIENT MESSAGE (OUTPATIENT)
Dept: NEUROLOGY | Facility: CLINIC | Age: 24
End: 2024-08-27

## 2024-08-27 ENCOUNTER — OFFICE VISIT (OUTPATIENT)
Dept: NEUROLOGY | Facility: CLINIC | Age: 24
End: 2024-08-27
Payer: COMMERCIAL

## 2024-08-27 DIAGNOSIS — G93.2 IIH (IDIOPATHIC INTRACRANIAL HYPERTENSION): ICD-10-CM

## 2024-08-27 DIAGNOSIS — G43.109 MIGRAINE WITH AURA AND WITHOUT STATUS MIGRAINOSUS, NOT INTRACTABLE: Primary | ICD-10-CM

## 2024-08-27 DIAGNOSIS — F44.5 PSYCHOGENIC NONEPILEPTIC SEIZURE: ICD-10-CM

## 2024-08-27 PROCEDURE — 1160F RVW MEDS BY RX/DR IN RCRD: CPT | Mod: CPTII,95,, | Performed by: NURSE PRACTITIONER

## 2024-08-27 PROCEDURE — 1159F MED LIST DOCD IN RCRD: CPT | Mod: CPTII,95,, | Performed by: NURSE PRACTITIONER

## 2024-08-27 PROCEDURE — 99214 OFFICE O/P EST MOD 30 MIN: CPT | Mod: 95,,, | Performed by: NURSE PRACTITIONER

## 2024-08-27 RX ORDER — VENLAFAXINE HYDROCHLORIDE 37.5 MG/1
37.5 CAPSULE, EXTENDED RELEASE ORAL DAILY
Qty: 30 CAPSULE | Refills: 11 | Status: SHIPPED | OUTPATIENT
Start: 2024-08-27 | End: 2025-08-27

## 2024-08-27 RX ORDER — RIZATRIPTAN BENZOATE 10 MG/1
10 TABLET ORAL
Qty: 12 TABLET | Refills: 2 | Status: SHIPPED | OUTPATIENT
Start: 2024-08-27

## 2024-08-27 NOTE — PROGRESS NOTES
"[facility] Telemedicine Follow Up Visit Note    This is a real-time audio/video visit that was performed with the originating site at patient's home and the distant site, [facility]. Verbal consent to participate in interactive audio & video visit was obtained.    I discussed with the patient regarding the nature of our telehealth visits, that:    - Our sessions are not being recorded and that personal health information is protected  - Provider would evaluate the patient and recommend diagnostics and treatments based on my assessment  - [facility] will provide follow up care in person if/when the patient needs it.     Subjective:      Patient ID: Carolina Rabago is a 24 y.o. female.    Chief Complaint: Migraine (States 3 per year)    HPI   This is a 24 year old right handed female with PMHX of ADHD, migraine headache disorder, who was referred for paroxysmal events X1. Pt was last seen on 3/4/2024. During that visit, Effexor 37.5mg Qday, Maxalt 10mg PO BID PRN and tizanidine 2mg PO BID PRN were continued. TPM was increased to 50mg BID.     Today, Pt states HA have improved to 3 migraines per year and are less severe in nature. Not taking TPM or Diamox as she states she did not like the way they made her feel. Not currently taking any diuretics. Has appt with Dr. Irvin (ophthalmology) papilledema tomorrow. Currently taking rizatriptan PRN and is effective for migraine.     Denies any further "seizure" activity. Currently employed. Some nights sleeps better than others. Walking daily before going to work.    Paroxsysmal Event  Age of Onset - 21, one time event    Semiology - headache followed by LOC. possibly shaking. Mom has witnessed the event but did not elaborate on the details of the event.    Frequency - last event 10/2020    Provocation Factors - headache    Risk Factors -  - Family history of seizures? denied  - " History of focal CNS lesions? denied  - History of CNS infection? denied  - History of Head Trauma with prolonged LOC? denied  - History of childhood seizures, including febrile seizures? denied  - History of developmental delay? denied  - History of underlying mood disorder? ADHD, taking Adderall during the day  - History of sleep disorder? not sleeping well at night  - Illicit drug use? denied  - Alcohol use? denied    Workup -  - routine EE2021 - Normal routine awake and drowsy EEG (read by Dr. 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 Holocephalic, pounding, severe, lasting 24 hrs, with n/v, w/ photo and phonophobia. - 3 MHD/Month  2. Bitemporal, dull, constant, mild, w/o nausea, w/ photophobia, not impeding day to day activity - 1 headache days per month    Pre-CGRP Inhibitor Frequency - 4 MGD/month    Exacerbating Factors - stress.    Current ppx meds -  Effexor ER 37.5mg (2022 - present) effective  Diamox 500mg PO BID - not currently taking    Current abortive meds -  Maxalt 10mg PO PRN migraine     Prior ppx meds -  Amitriptyline 50mg qhs - weight gain, sedation. Only takes PRN.  Emgality 120mg/mL once per month  TPM 50mg PO BID      Prior abortive meds -   Sumatriptan PRN - ineffective   Tizanidine 2mg PO TID PRN    Review of Systems    Constitutional: no fever, fatigue, weakness  Eye: no vision loss, eye redness, drainage, or pain  ENMT: no sore throat, ear pain, sinus pain/congestion, nasal congestion/drainage  Respiratory: no cough, no wheezing, no shortness of breath  Cardiovascular: no chest pain, no palpitations, no edema  Gastrointestinal: no  nausea, vomiting, or diarrhea. No abdominal pain  Genitourinary: no dysuria, no urinary frequency or urgency, no hematuria  Hema/Lymph: no abnormal bruising or bleeding  Endocrine: no heat or cold intolerance, no excessive thirst or excessive urination  Musculoskeletal: no muscle or joint pain, no joint swelling  Integumentary: no skin rash or abnormal lesion  Psychiatric: no depressed mood, no anxiety, no visual/auditory hallucinations, no delusions, no suicidal or homicidal ideation  Neurologic: as per HPI    Objective:      Physical Exam    General: well-developed well-nourished in no acute distress  Eye: clear conjunctiva, eyelids normal  HENT: oropharynx without erythema/exudate, oropharynx and nasal mucosal surfaces moist  Neck: full range of motion  Respiratory: no distress on RA  Cardiovascular: TERESA due to nature of visit   Gastrointestinal: non-distended, no guarding, no distension  Musculoskeletal: full range of motion of all extremities/spine without limitation or discomfort  Integumentary: no rashes or skin lesions present    Neurologic:  Mental Status- Alert and Oriented to time, self, place, Speech is fluent, with intact repetition, naming, reading, and comprehension, long term memory intact, No Dysarthria.  CN II-XII - No ptosis b/l, EOMI w/o nystagmus, T/U midline, Shoulder shrug symmetric b/l, Hearing grossly intact b/l, Face Symmetric.  Motor - Tone and Bulk nml throughout, No involuntary movements, no satelliting, FFM nml b/l, No pronator drift.  Sensory - TERESA due to nature of visit   Reflexes - TERESA due to nature of visit   Cerebellar Exam - FNF wnl b/l no intention tremor.  Romberg - negative.  Gait - Normal, bilat arm swing intaact    Assessment:       1. Migraine with aura and without status migrainosus, not intractable    2. IIH (idiopathic intracranial hypertension)    3. Psychogenic nonepileptic seizure      Plan:       This is a 24 year old right handed female with PMHX of ADHD, Migraine  headache disorder, Anxiety disorder who was referred for paroxysmal event with description consistent with PNES. Migraine have decreased to 3/year. Rizatriptan effective as abortive therapy. However, is not currently taking TPM or Diamox for IIH. States she does not like the way they make her feel. Has appt tomorrow with ophthalmology. Taking Effexor 37.5mg daily. Sleep is intermittent    # PNES  [] stable    # Episodic Migraine Headache w/o Aura  [] patient does not meet criteria for monthly CGRP inhibitor biologics.  [] c/w Maxalt 10mg BID PRN  [] c/w Effexor XR to 37.5 mg daily    #IIH  [] strongly suggested she restart Diamox as untreated IIH can lead to vision loss    RTC 9 months - TM okay    Headache education provided - including good sleep hygiene, stress management, medication overuse education provided - using more 3 OTC per week may worsen headaches, High intensity interval training has shown to reduce headache frequency. Low carb, high protein has shown to reduce headache frequency. Patient is instructed to keep headache diary.    I have explained the treatment plan, diagnosis, and prognosis to the patient, caretaker, family. All questions are answered to the best of my knowledge.    Face to Face Time 30 minute, including, counseling, education, review of test results, relevant medical records, and coordination of care.     Sumaya Parisi, NP-C  General Neurology

## 2024-08-28 ENCOUNTER — OFFICE VISIT (OUTPATIENT)
Dept: OPHTHALMOLOGY | Facility: CLINIC | Age: 24
End: 2024-08-28
Payer: COMMERCIAL

## 2024-08-28 VITALS — WEIGHT: 198 LBS | HEIGHT: 69 IN | BODY MASS INDEX: 29.33 KG/M2

## 2024-08-28 DIAGNOSIS — G93.2 IDIOPATHIC INTRACRANIAL HYPERTENSION: Primary | ICD-10-CM

## 2024-08-28 PROCEDURE — 92133 CPTRZD OPH DX IMG PST SGM ON: CPT | Mod: PBBFAC,PN | Performed by: OPHTHALMOLOGY

## 2024-08-28 PROCEDURE — 92083 EXTENDED VISUAL FIELD XM: CPT | Mod: PBBFAC,PN | Performed by: STUDENT IN AN ORGANIZED HEALTH CARE EDUCATION/TRAINING PROGRAM

## 2024-08-28 PROCEDURE — 92083 EXTENDED VISUAL FIELD XM: CPT | Mod: PBBFAC,PN | Performed by: OPHTHALMOLOGY

## 2024-08-28 PROCEDURE — 92133 CPTRZD OPH DX IMG PST SGM ON: CPT | Mod: PBBFAC,PN | Performed by: STUDENT IN AN ORGANIZED HEALTH CARE EDUCATION/TRAINING PROGRAM

## 2024-08-28 PROCEDURE — 99213 OFFICE O/P EST LOW 20 MIN: CPT | Mod: PBBFAC,PN,25 | Performed by: STUDENT IN AN ORGANIZED HEALTH CARE EDUCATION/TRAINING PROGRAM

## 2024-08-28 RX ORDER — ACETAZOLAMIDE 500 MG/1
500 CAPSULE, EXTENDED RELEASE ORAL 2 TIMES DAILY
Qty: 60 CAPSULE | Refills: 6 | Status: SHIPPED | OUTPATIENT
Start: 2024-08-28 | End: 2025-08-28

## 2024-08-28 NOTE — PROGRESS NOTES
HPI     Eye Exam     Additional comments: DFE, OCT rnfl, HVF           Comments    Idiopathic intracranial hypertension          Last edited by Elmira Jonas LPN on 8/28/2024  3:09 PM.            Assessment /Plan     For exam results, see Encounter Report.    Idiopathic intracranial hypertension                     Assessment /Plan              Octn   8/28/24: 230//112: swelling ou    6/6/24  - OD: 216, all white  - OS: 115, white I    OCT RNFL 11/07/2023   OD: 4/10 signal strength, 179 average RNFL thickness, S/N/I thickening   OS: 6/10 signal strength, 115 average RNFL thickness, I thickening    4/1/24: 186 white w/ inf artifact // 113 white I else green       Vf 8/28/24 high FL ou: likely unreliable non specific IN// likely unreliable sl blind spot enlargement    HVF Central 24-2 Threshold Test 11/07/2023  OD: 1/10 fixation losses, 0% false positives, 7% false negatives, enlarged blind spot  OS: 1/11 fixation losses, 0% false positives, 0% false negatives, full, minor defect near blind spot    1. Idiopathic intracranial hypertension  - Noted on initial exam here 11/7/23  - Patient with history of migraines since age of 14  - Initial weight 11/7/23: 197 lbs, target 20 lbs weight loss--> no weight loss 8/28/24  - OCT nerve 8/28/24 swelling ou: stable to sl increased in OD// stable OS   - HVF 24-2 8/28/24 full OD// sl enlarged blind spot OS   - MRI/MRV 12/23 wnl per read  - LP 12/23: OP 27, negative labs.   - Did not tolerate topamax. On diamox 500mg BID started 4/2024  - 6/6/24: Optos completed. Increased diamox to TID given increased edema   - 8/28/24 pt stopped diamox with stable edema ou (possible sl increase in OD on octn) on dfe, VF full ou, stable good vision ou. Unable to see nutrition due to insurance. Walking daily. Has not been able to change diet. Will have pt ask pcp for different nutrition resources at next visit this month. Discussed could lose vision long term, permanently up to blindness. Pt  understands and will start back diamox 500mg bid. Can increase next visit if needed.     2. Astigmatism, both eyes  - BCVA 20/20 OU 11/7/23, MRx provided    3. Dry eye syndrome both eyes  - Artificial tears QID OU    RTC 3 months dfe octn ou sooner prn

## 2024-12-02 ENCOUNTER — OFFICE VISIT (OUTPATIENT)
Dept: OPHTHALMOLOGY | Facility: CLINIC | Age: 24
End: 2024-12-02

## 2024-12-02 VITALS — BODY MASS INDEX: 29.33 KG/M2 | WEIGHT: 198 LBS | HEIGHT: 69 IN

## 2024-12-02 DIAGNOSIS — H04.123 DRY EYE SYNDROME OF BOTH EYES: ICD-10-CM

## 2024-12-02 DIAGNOSIS — G93.2 IDIOPATHIC INTRACRANIAL HYPERTENSION: Primary | ICD-10-CM

## 2024-12-02 RX ORDER — NITROFURANTOIN 25; 75 MG/1; MG/1
100 CAPSULE ORAL 2 TIMES DAILY
COMMUNITY
Start: 2024-09-10

## 2024-12-02 RX ORDER — SUMATRIPTAN SUCCINATE 25 MG/1
25 TABLET ORAL DAILY PRN
COMMUNITY

## 2024-12-02 RX ORDER — CIPROFLOXACIN 500 MG/1
500 TABLET ORAL 2 TIMES DAILY
COMMUNITY
Start: 2024-10-12

## 2024-12-02 RX ORDER — PHENAZOPYRIDINE HYDROCHLORIDE 200 MG/1
200 TABLET, FILM COATED ORAL
COMMUNITY
Start: 2024-10-12

## 2024-12-02 NOTE — PROGRESS NOTES
HPI    RTC 3 months DFE/OCTN OU, sooner PRN  Last edited by Pamela Carrillo on 12/2/2024  1:59 PM.            Assessment /Plan     For exam results, see Encounter Report.    Idiopathic intracranial hypertension  -     Ambulatory referral/consult to Sleep Disorders; Future; Expected date: 12/09/2024  -     Ambulatory Referral/Consult to Lifestyle Nutrition; Future; Expected date: 12/09/2024    Dry eye syndrome of both eyes        OCT RNFL   8/28/24:   OD: 230: swelling  OS: 112: swelling   12/2/2024  OD: 261, all white, worse from prior  OS: 113, white I rest green    Vf 8/28/24 high FL ou: likely unreliable non specific IN// likely unreliable sl blind spot enlargement    HVF Central 24-2 Threshold Test 11/07/2023  OD: 1/10 fixation losses, 0% false positives, 7% false negatives, enlarged blind spot  OS: 1/11 fixation losses, 0% false positives, 0% false negatives, full, minor defect near blind spot    1. Idiopathic intracranial hypertension  - Noted on initial exam here 11/7/23  - Patient with history of migraines since age of 14  - Initial weight 11/7/23: 197 lbs, target 20 lbs weight loss--> no weight loss 8/28/24  - OCT nerve 8/28/24 swelling ou: stable to sl increased in OD// stable OS   - HVF 24-2 8/28/24 full OD// sl enlarged blind spot OS   - MRI/MRV 12/23 wnl per read  - LP 12/23: OP 27, negative labs.   - Did not tolerate topamax. On diamox 500mg BID started 4/2024  - 6/6/24: Optos completed. Increased diamox to TID given increased edema   - 8/28/24 pt stopped diamox with stable edema ou (possible sl increase in OD on octn) on dfe, VF full ou, stable good vision ou. Unable to see nutrition due to insurance. Walking daily. Has not been able to change diet. Will have pt ask pcp for different nutrition resources at next visit this month. Discussed could lose vision long term, permanently up to blindness. Pt understands and will start back diamox 500mg bid. Can increase next visit if needed.   - 12/2/2024:  Taking Diamox 500mg daily as BID makes her have to pee all the time. Increase back to BID as the swelling has increased. Informed her she needs to take at least BID until she has lost weight   - Informed PT to talk to PCP about weight loss options  - Nutrition referral for help with weight loss  - Order sleep study to look for BONNIE possibly causing secondary IIH    2. Astigmatism, both eyes  - BCVA 20/20 OU 11/7/23, MRx provided    3. Dry eye syndrome both eyes  - Artificial tears QID OU    RTC 6 weeks HVF rico day, then dfe octn ou 1-2 weeks later

## 2025-02-01 NOTE — PROGRESS NOTES
HPI    RTC 6 weeks HVF rico day, then dfe octn ou 1-2 weeks later  HVF not done, patient cancelled then no showed rescheduled appt.  Last edited by Lea Dias RN on 2/3/2025 12:28 PM.      Assessment /Plan     For exam results, see Encounter Report.    Idiopathic intracranial hypertension  -     tropicamide /PHENYLephrine opthalmic solution 1 drop  -     proparacaine 0.5 % ophthalmic solution 1 drop      OCT RNFL   8/28/24:   OD: 230: swelling  OS: 112: swelling   12/2/2024  OD: 261, all white, worse from prior  OS: 113, white I rest green   02/03/2025  OD: 296, all white, worsening  OS: 114, white inf, rest green    Vf 8/28/24 high FL ou: likely unreliable non specific IN// likely unreliable sl blind spot enlargement    HVF Central 24-2 Threshold Test 11/07/2023  OD: 1/10 fixation losses, 0% false positives, 7% false negatives, enlarged blind spot  OS: 1/11 fixation losses, 0% false positives, 0% false negatives, full, minor defect near blind spot    1. Idiopathic intracranial hypertension  - Noted on initial exam here 11/7/23  - Patient with history of migraines since age of 14  - Initial weight 11/7/23: 197 lbs, target 20 lbs weight loss--> no weight loss 8/28/24  - OCT nerve 8/28/24 swelling ou: stable to sl increased in OD// stable OS   - HVF 24-2 8/28/24 full OD// sl enlarged blind spot OS   - MRI/MRV 12/23 wnl per read  - LP 12/23: OP 27, negative labs.   - Did not tolerate topamax. On diamox 500mg BID started 4/2024  - 6/6/24: Optos completed. Increased diamox to TID given increased edema   - 8/28/24 pt stopped diamox with stable edema ou (possible sl increase in OD on octn) on dfe, VF full ou, stable good vision ou. Unable to see nutrition due to insurance. Walking daily. Has not been able to change diet. Will have pt ask pcp for different nutrition resources at next visit this month. Discussed could lose vision long term, permanently up to blindness. Pt understands and will start back diamox 500mg bid.  Can increase next visit if needed.   - 12/2/2024: Taking Diamox 500mg daily as BID makes her have to pee all the time. Increase back to BID as the swelling has increased. Informed her she needs to take at least BID until she has lost weight   - Informed PT to talk to PCP about weight loss options  - Nutrition referral for help with weight loss  - Order sleep study to look for BONNIE possibly causing secondary IIH  - 2/3/25: Patient no showed HVF appointment. Worsening swelling. Non-adherent to diamox 500mg bid, counseled on need to use consistently otherwise risk of permanent blindness. Issue appears to be busy work schedule, counseled on adherence techniques. Sleep study referral pending, nutrition pending. Would consider bariatric surgery, however patient is self pay. Color plates 16/16/ OU. Will have patient return for VF on a rico day prior to appointment. RTC 2-3 months for IOP, optos, OCTN, color plates.     2. Astigmatism, both eyes  - BCVA 20/20 OU 11/7/23, MRx provided    3. Dry eye syndrome both eyes  - Artificial tears QID OU    RTC 2-3 iop, dilate, check on bariatric surgery referral, optos, OCTN. RTC VF rico day prior to above appointment.

## 2025-02-03 ENCOUNTER — OFFICE VISIT (OUTPATIENT)
Dept: OPHTHALMOLOGY | Facility: CLINIC | Age: 25
End: 2025-02-03

## 2025-02-03 VITALS — HEIGHT: 69 IN | BODY MASS INDEX: 29.39 KG/M2 | WEIGHT: 198.44 LBS

## 2025-02-03 DIAGNOSIS — G93.2 IDIOPATHIC INTRACRANIAL HYPERTENSION: Primary | ICD-10-CM

## 2025-02-03 RX ORDER — PROPARACAINE HYDROCHLORIDE 5 MG/ML
1 SOLUTION/ DROPS OPHTHALMIC
Status: COMPLETED | OUTPATIENT
Start: 2025-02-03 | End: 2025-02-03

## 2025-02-03 RX ORDER — PHENYLEPH/TROPICAMIDE IN WATER 2.5 %-1 %
1 DROPS OPHTHALMIC (EYE) ONCE
Status: COMPLETED | OUTPATIENT
Start: 2025-02-03 | End: 2025-02-03

## 2025-02-03 RX ADMIN — PROPARACAINE HYDROCHLORIDE 1 DROP: 5 SOLUTION/ DROPS OPHTHALMIC at 12:02

## 2025-02-03 RX ADMIN — Medication 1 DROP: at 12:02
